# Patient Record
Sex: MALE | Race: WHITE | Employment: OTHER | URBAN - METROPOLITAN AREA
[De-identification: names, ages, dates, MRNs, and addresses within clinical notes are randomized per-mention and may not be internally consistent; named-entity substitution may affect disease eponyms.]

---

## 2018-02-16 ENCOUNTER — HOSPITAL ENCOUNTER (OUTPATIENT)
Dept: NUCLEAR MEDICINE | Age: 78
Discharge: HOME | End: 2018-02-16
Attending: UROLOGY
Payer: MEDICARE

## 2018-02-16 DIAGNOSIS — C61 PROSTATE CANCER (HCC): ICD-10-CM

## 2018-02-16 PROCEDURE — 78306 BONE IMAGING WHOLE BODY: CPT

## 2018-02-26 ENCOUNTER — HOSPITAL ENCOUNTER (OUTPATIENT)
Dept: RADIATION THERAPY | Age: 78
Discharge: HOME | End: 2018-02-26

## 2018-03-13 ENCOUNTER — ANESTHESIA (OUTPATIENT)
Dept: ENDOSCOPY | Age: 78
End: 2018-03-13
Payer: MEDICARE

## 2018-03-13 ENCOUNTER — ANESTHESIA EVENT (OUTPATIENT)
Dept: ENDOSCOPY | Age: 78
End: 2018-03-13
Payer: MEDICARE

## 2018-03-13 ENCOUNTER — HOSPITAL ENCOUNTER (OUTPATIENT)
Age: 78
Setting detail: OUTPATIENT SURGERY
Discharge: HOME | End: 2018-03-13
Attending: INTERNAL MEDICINE | Admitting: INTERNAL MEDICINE
Payer: MEDICARE

## 2018-03-13 VITALS
HEIGHT: 70 IN | SYSTOLIC BLOOD PRESSURE: 112 MMHG | DIASTOLIC BLOOD PRESSURE: 53 MMHG | TEMPERATURE: 98.4 F | HEART RATE: 58 BPM | RESPIRATION RATE: 19 BRPM | BODY MASS INDEX: 30.64 KG/M2 | WEIGHT: 214 LBS | OXYGEN SATURATION: 97 %

## 2018-03-13 LAB
GLUCOSE BLD STRIP.AUTO-MCNC: 130 MG/DL (ref 65–100)
SERVICE CMNT-IMP: ABNORMAL

## 2018-03-13 PROCEDURE — 74011250636 HC RX REV CODE- 250/636: Performed by: INTERNAL MEDICINE

## 2018-03-13 PROCEDURE — 77030013992 HC SNR POLYP ENDOSC BSC -B: Performed by: INTERNAL MEDICINE

## 2018-03-13 PROCEDURE — 82962 GLUCOSE BLOOD TEST: CPT

## 2018-03-13 PROCEDURE — 74011000250 HC RX REV CODE- 250

## 2018-03-13 PROCEDURE — 76040000019: Performed by: INTERNAL MEDICINE

## 2018-03-13 PROCEDURE — 76060000031 HC ANESTHESIA FIRST 0.5 HR: Performed by: INTERNAL MEDICINE

## 2018-03-13 PROCEDURE — 74011250636 HC RX REV CODE- 250/636

## 2018-03-13 PROCEDURE — 88305 TISSUE EXAM BY PATHOLOGIST: CPT | Performed by: INTERNAL MEDICINE

## 2018-03-13 RX ORDER — PROPOFOL 10 MG/ML
INJECTION, EMULSION INTRAVENOUS
Status: DISCONTINUED | OUTPATIENT
Start: 2018-03-13 | End: 2018-03-13 | Stop reason: HOSPADM

## 2018-03-13 RX ORDER — SODIUM CHLORIDE 9 MG/ML
INJECTION, SOLUTION INTRAVENOUS
Status: DISCONTINUED | OUTPATIENT
Start: 2018-03-13 | End: 2018-03-13 | Stop reason: HOSPADM

## 2018-03-13 RX ORDER — ATROPINE SULFATE 0.1 MG/ML
0.4 INJECTION INTRAVENOUS
Status: DISCONTINUED | OUTPATIENT
Start: 2018-03-13 | End: 2018-03-13 | Stop reason: HOSPADM

## 2018-03-13 RX ORDER — DEXTROMETHORPHAN/PSEUDOEPHED 2.5-7.5/.8
1.2 DROPS ORAL
Status: DISCONTINUED | OUTPATIENT
Start: 2018-03-13 | End: 2018-03-13 | Stop reason: HOSPADM

## 2018-03-13 RX ORDER — LIDOCAINE HYDROCHLORIDE 20 MG/ML
INJECTION, SOLUTION EPIDURAL; INFILTRATION; INTRACAUDAL; PERINEURAL AS NEEDED
Status: DISCONTINUED | OUTPATIENT
Start: 2018-03-13 | End: 2018-03-13 | Stop reason: HOSPADM

## 2018-03-13 RX ORDER — SODIUM CHLORIDE 9 MG/ML
50 INJECTION, SOLUTION INTRAVENOUS CONTINUOUS
Status: DISCONTINUED | OUTPATIENT
Start: 2018-03-13 | End: 2018-03-13 | Stop reason: HOSPADM

## 2018-03-13 RX ORDER — FLUMAZENIL 0.1 MG/ML
0.2 INJECTION INTRAVENOUS
Status: DISCONTINUED | OUTPATIENT
Start: 2018-03-13 | End: 2018-03-13 | Stop reason: HOSPADM

## 2018-03-13 RX ORDER — NALOXONE HYDROCHLORIDE 0.4 MG/ML
0.4 INJECTION, SOLUTION INTRAMUSCULAR; INTRAVENOUS; SUBCUTANEOUS
Status: DISCONTINUED | OUTPATIENT
Start: 2018-03-13 | End: 2018-03-13 | Stop reason: HOSPADM

## 2018-03-13 RX ORDER — EPINEPHRINE 0.1 MG/ML
1 INJECTION INTRACARDIAC; INTRAVENOUS
Status: DISCONTINUED | OUTPATIENT
Start: 2018-03-13 | End: 2018-03-13 | Stop reason: HOSPADM

## 2018-03-13 RX ORDER — GLYBURIDE 5 MG/1
5 TABLET ORAL
COMMUNITY
End: 2022-03-16

## 2018-03-13 RX ORDER — PROPOFOL 10 MG/ML
INJECTION, EMULSION INTRAVENOUS AS NEEDED
Status: DISCONTINUED | OUTPATIENT
Start: 2018-03-13 | End: 2018-03-13 | Stop reason: HOSPADM

## 2018-03-13 RX ORDER — MIDAZOLAM HYDROCHLORIDE 1 MG/ML
.25-5 INJECTION, SOLUTION INTRAMUSCULAR; INTRAVENOUS
Status: DISCONTINUED | OUTPATIENT
Start: 2018-03-13 | End: 2018-03-13 | Stop reason: HOSPADM

## 2018-03-13 RX ADMIN — PROPOFOL 100 MCG/KG/MIN: 10 INJECTION, EMULSION INTRAVENOUS at 13:43

## 2018-03-13 RX ADMIN — LIDOCAINE HYDROCHLORIDE 50 MG: 20 INJECTION, SOLUTION EPIDURAL; INFILTRATION; INTRACAUDAL; PERINEURAL at 13:43

## 2018-03-13 RX ADMIN — PROPOFOL 60 MG: 10 INJECTION, EMULSION INTRAVENOUS at 13:43

## 2018-03-13 RX ADMIN — SODIUM CHLORIDE: 9 INJECTION, SOLUTION INTRAVENOUS at 13:40

## 2018-03-13 RX ADMIN — SODIUM CHLORIDE: 9 INJECTION, SOLUTION INTRAVENOUS at 13:36

## 2018-03-13 RX ADMIN — SODIUM CHLORIDE 50 ML/HR: 900 INJECTION, SOLUTION INTRAVENOUS at 12:10

## 2018-03-13 NOTE — PROCEDURES
Georgia Son M.D.  [phone]            3/13/2018          Colonoscopy Operative Report  Sal Dominguez  :  1940  [facility] Medical Record Number:  [id number]      Indications:    Screening colonoscopy     :  Darvin Huynh MD    Referring Provider: Perla Chapa MD    Sedation:  MAC anesthesia    Pre-Procedural Exam:      Airway: clear,  No airway problems anticipated  Heart: RRR, without gallops or rubs  Lungs: clear bilaterally without wheezes, crackles, or rhonchi  Abdomen: soft, nontender, nondistended, bowel sounds present  Mental Status: awake, alert and oriented to person, place and time     Procedure Details:  After informed consent was obtained with all risks and benefits of procedure explained and preoperative exam completed, the patient was taken to the endoscopy suite and placed in the left lateral decubitus position. Upon sequential sedation as per above, a digital rectal exam was performed. The Olympus videocolonoscope  was inserted in the rectum and carefully advanced to the cecum, which was identified by the ileocecal valve and appendiceal orifice. The quality of preparation was good. The colonoscope was slowly withdrawn with careful inspection and evaluation between folds. Retroflexion in the rectum was performed. Findings:   Terminal Ileum: not intubated  Cecum: normal  Ascending Colon: 1  Sessile polyp(s), the largest 4 mm in size;  Transverse Colon: normal  Descending Colon: 1  Sessile polyp(s), the largest 3 mm in size;  Sigmoid: no mucosal lesion appreciated  moderate diverticulosis; Rectum: no mucosal lesion appreciated  Grade 1 internal hemorrhoid(s);     Interventions:  2 complete polypectomy were performed using cold snare  and the polyps were  retrieved    Specimen Removed:  specimen #1, 4 mm in size, located in the ascending colon removed by cold snare and retrieved for pathology  #2, 3 mm in size, located in the descending colon removed by cold snare and retrieved for pathology    Complications: None. EBL:  None. Impression:  Two polyps removed and sent to pathology, otherwise mucosa within normal.                         Sigmoid diverticulosis and internal hemorrhoids    Recommendations:  -If adenoma is present, repeat colonoscopy in 5 years.   -High fiber diet.    -Resume normal medication(s). Discharge Disposition:  Home in the company of a  when able to ambulate.     Ez Hansen MD  3/13/2018  2:01 PM

## 2018-03-13 NOTE — ANESTHESIA POSTPROCEDURE EVALUATION
Post-Anesthesia Evaluation and Assessment    Patient: Capri Ceballos MRN: [id number]  SSN: [id number]    YOB: 1940  Age: 78 y.o. Sex: male       Cardiovascular Function/Vital Signs  Visit Vitals    /82    Pulse 73    Temp 36.7 °C (98.1 °F)    Resp 20    Ht 5' 10\" (1.778 m)    Wt 97.1 kg (214 lb)    SpO2 99%    BMI 30.71 kg/m2       Patient is status post MAC anesthesia for Procedure(s):  COLONOSCOPY  ENDOSCOPIC POLYPECTOMY. Nausea/Vomiting: None    Postoperative hydration reviewed and adequate. Pain:  Pain Scale 1: Numeric (0 - 10) (03/13/18 1157)  Pain Intensity 1: 0 (03/13/18 1157)   Managed    Neurological Status: At baseline    Mental Status and Level of Consciousness: Arousable    Pulmonary Status:   O2 Device: Room air (03/13/18 1155)   Adequate oxygenation and airway patent    Complications related to anesthesia: None    Post-anesthesia assessment completed.  No concerns    Signed By: Francia Rhodes MD     March 13, 2018

## 2018-03-13 NOTE — ROUTINE PROCESS
[facility]  1940  [id number]    Situation:  Verbal report received from: Jessy Davis RN  Procedure: Procedure(s):  COLONOSCOPY  ENDOSCOPIC POLYPECTOMY    Background:    Preoperative diagnosis: SCREENING  Postoperative diagnosis: diverticulosis, ascending colon polyp, descending colon polyp    :  Dr. Gala Diop  Assistant(s): Endoscopy Technician-1: Shira Cui  Endoscopy RN-1: Batool Deras RN    Specimens:   ID Type Source Tests Collected by Time Destination   1 : ascending colon polyp Preservative Colon, Ascending  Amado Clinton MD 3/13/2018 1351 Pathology   2 : descending colon polyp Preservative Colon, Descending  Amado Clinton MD 3/13/2018 1354 Pathology     H. Pylori  no    Assessment:  Intra-procedure medications     Anesthesia gave intra-procedure sedation and medications, see anesthesia flow sheet yes    Intravenous fluids: NS@ KVO     Vital signs stable     Abdominal assessment: round and soft     Recommendation:  Discharge patient per MD order  Family or Friend   Permission to share finding with family or friend yes

## 2018-03-13 NOTE — H&P
Ingrid Snyder M.D.  [phone]            History and Physical       NAME:  Preethi Cooney   :   1940   MRN:   [id number]       Referring Physician:  Dr. Pratt [facility] Date: 3/13/2018 1:38 PM    Chief Complaint:  Colon cancer screening    History of Present Illness:  Patient is a 78 y.o. who is seen for colon cancer screening. Denies any ongoing GI complaints. 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 Taking?   atorvastatin (LIPITOR) 10 mg tablet 3/11/2018 at am  Yes No   Sig: Take  by mouth daily. glyBURIDE (DIABETA) 5 mg tablet 3/12/2018 at am  Yes Yes   Sig: Take 5 mg by mouth Daily (before breakfast). metformin (GLUCOPHAGE) 500 mg tablet 3/11/2018 at am  Yes No   Sig: Take 500 mg by mouth three (3) times daily (with meals). omega-3 fatty acids-vitamin e (FISH OIL) 1,000 mg Cap 3/11/2018 at am  Yes No   Sig: Take  by mouth two (2) times a day. valsartan-hydrochlorothiazide (DIOVAN HCT) 160-12.5 mg per tablet 3/13/2018 at am  Yes No   Sig: Take 1 Tab by mouth daily.       Facility-Administered Medications: None       Hospital Medications:  Current Facility-Administered Medications   Medication Dose Route Frequency    0.9% sodium chloride infusion  50 mL/hr IntraVENous CONTINUOUS    midazolam (VERSED) injection 0.25-5 mg  0.25-5 mg IntraVENous Multiple    naloxone (NARCAN) injection 0.4 mg  0.4 mg IntraVENous Multiple    flumazenil (ROMAZICON) 0.1 mg/mL injection 0.2 mg  0.2 mg IntraVENous Multiple    simethicone (MYLICON) 03BX/2.5UK oral drops 80 mg  1.2 mL Oral Multiple    atropine injection 0.4 mg  0.4 mg IntraVENous ONCE PRN    EPINEPHrine (ADRENALIN) 0.1 mg/mL syringe 1 mg  1 mg Endoscopically ONCE PRN       Social History:  Social History   Substance Use Topics    Smoking status: Never Smoker    Smokeless tobacco: Never Used    Alcohol use 1.2 oz/week     2 Shots of liquor per week       Family History:  History reviewed. No pertinent family history. Review of Systems:      Constitutional: negative fever, negative chills, negative weight loss  Eyes:   negative visual changes  ENT:   negative sore throat, tongue or lip swelling  Respiratory:  negative cough, negative dyspnea  Cards:  negative for chest pain, palpitations, lower extremity edema  GI:   See HPI  :  negative for frequency, dysuria  Integument:  negative for rash and pruritus  Heme:  negative for easy bruising and gum/nose bleeding  Musculoskel: negative for myalgias,  back pain and muscle weakness  Neuro: negative for headaches, dizziness, vertigo  Psych:  negative for feelings of anxiety, depression       Objective:   Patient Vitals for the past 8 hrs:   BP Temp Pulse Resp SpO2 Height Weight   03/13/18 1155 174/82 98.1 °F (36.7 °C) 73 20 99 % 5' 10\" (1.778 m) 97.1 kg (214 lb)             EXAM:     NEURO-a&o   HEENT-wnl   LUNGS-clear    COR-regular rate and rhythym     ABD-soft , no tenderness, no rebound, good bs     EXT-no edema     Data Review     No results for input(s): WBC, HGB, HCT, PLT, HGBEXT, HCTEXT, PLTEXT in the last 72 hours. No results for input(s): NA, K, CL, CO2, BUN, CREA, GLU, PHOS, CA in the last 72 hours.   No results for input(s): SGOT, GPT, AP, TBIL, TP, ALB, GLOB, GGT, AML, LPSE in the last 72 hours. No lab exists for component: AMYP, HLPSE  No results for input(s): INR, PTP, APTT in the last 72 hours. No lab exists for component: INREXT    Patient Active Problem List   Diagnosis Code    CAD (coronary artery disease), native coronary artery I25.10    Type II or unspecified type diabetes mellitus without mention of complication, not stated as uncontrolled E11.9    Essential hypertension, benign I10    Obesity E66.9    S/P CABG (status post coronary artery bypass graft)       Assessment:   · Colon cancer screening   Plan:   · Colonoscopy today.      Signed By: Francisco J Jimenez MD     3/13/2018  1:38 PM

## 2018-03-13 NOTE — ANESTHESIA PREPROCEDURE EVALUATION
Anesthetic History   No history of anesthetic complications            Review of Systems / Medical History  Patient summary reviewed, nursing notes reviewed and pertinent labs reviewed    Pulmonary  Within defined limits                 Neuro/Psych   Within defined limits           Cardiovascular    Hypertension          CAD, CABG (2010) and hyperlipidemia    Exercise tolerance: >4 METS  Comments: Not on beta blocker   GI/Hepatic/Renal  Within defined limits              Endo/Other    Diabetes    Cancer (prostate)     Other Findings   Comments: Screen    Prostate CA, getting work up before radiation         Physical Exam    Airway  Mallampati: IV  TM Distance: < 4 cm  Neck ROM: normal range of motion   Mouth opening: Diminished (comment)     Cardiovascular  Regular rate and rhythm,  S1 and S2 normal,  no murmur, click, rub, or gallop  Rhythm: regular  Rate: normal         Dental  No notable dental hx       Pulmonary  Breath sounds clear to auscultation               Abdominal  GI exam deferred       Other Findings            Anesthetic Plan    ASA: 3  Anesthesia type: MAC            Anesthetic plan and risks discussed with: Patient

## 2018-03-13 NOTE — DISCHARGE INSTRUCTIONS
[address] [facility]. Kiley Burgos M.D.  [phone]            COLON DISCHARGE INSTRUCTIONS       3/13/2018    Tanner Suarez  :  1940  [facility] Medical Record Number:  [id number]      COLONOSCOPY FINDINGS:  Your colonoscopy showed two small polyps and sigmoid diverticulosis, otherwise appeared within normal. Small internal hemorrhoids. DISCOMFORT:  Redness at IV site- apply warm compress to area; if redness or soreness persist- contact your physician  There may be a slight amount of blood passed from the rectum  Gaseous discomfort- walking, belching will help relieve any discomfort  You may not operate a vehicle for 12 hours  You may not engage in an occupation involving machinery or appliances for rest of today  You may not drink alcoholic beverages for at least 12 hours  Avoid making any critical decisions for at least 24 hour  DIET:   High fiber diet. - however -  remember your colon is empty and a heavy meal will produce gas. Avoid these foods:  vegetables, fried / greasy foods, carbonated drinks for today     ACTIVITY:  You may resume your normal daily activities it is recommended that you spend the remainder of the day resting -  avoid any strenuous activity. CALL M.D. ANY SIGN OF:   Increasing pain, nausea, vomiting  Abdominal distension (swelling)  New increased bleeding (oral or rectal)  Fever (chills)  Pain in chest area  Bloody discharge from nose or mouth   Shortness of breath    Follow-up Instructions:   Call Dr. Elizabet Sunshine if any questions or problems. Telephone # [phone]  Biopsy results will be available in  5 to 7 days  Should have a repeat colonoscopy in 5 years if polyps show adenoma.

## 2018-03-13 NOTE — PERIOP NOTES
Patient tolerated procedure without problems. Abdomen soft and patient arousable and voices no complaints Report received from CRNA, see anesthesia note. Patient transported to endoscopy recovery area and verbal bedside report given to [facility].

## 2018-03-13 NOTE — IP AVS SNAPSHOT
[address] 
[phone] Patient: Dada Reese MRN: [id number] YEX: About your hospitalization You were admitted on:  2018 You last received care in the:  [facility] ENDOSCOPY You were discharged on:  2018 Why you were hospitalized Your primary diagnosis was:  Not on File Follow-up Information None Discharge Orders None A check durga indicates which time of day the medication should be taken. My Medications CONTINUE taking these medications Instructions Each Dose to Equal  
 Morning Noon Evening Bedtime DIOVAN -12.5 mg per tablet Generic drug:  valsartan-hydroCHLOROthiazide Your last dose was: Your next dose is: Take 1 Tab by mouth daily. 1 Tab FISH OIL 1,000 mg Cap Generic drug:  omega-3 fatty acids-vitamin e Your last dose was: Your next dose is: Take  by mouth two (2) times a day. glyBURIDE 5 mg tablet Commonly known as:  Norm Mirtha Your last dose was: Your next dose is: Take 5 mg by mouth Daily (before breakfast). 5 mg  
    
   
   
   
  
 LIPITOR 10 mg tablet Generic drug:  atorvastatin Your last dose was: Your next dose is: Take  by mouth daily. metFORMIN 500 mg tablet Commonly known as:  GLUCOPHAGE Your last dose was: Your next dose is: Take 500 mg by mouth three (3) times daily (with meals). 500 mg Discharge Instructions [address] [facility] [facility] Pedrito Ocampo M.D. 
[phone] COLON DISCHARGE INSTRUCTIONS 
    
3/13/2018 Dada Reese :  1940 [place] Medical Record Number:  [id number] COLONOSCOPY FINDINGS: 
 Your colonoscopy showed two small polyps and sigmoid diverticulosis, otherwise appeared within normal. Small internal hemorrhoids. DISCOMFORT: 
Redness at IV site- apply warm compress to area; if redness or soreness persist- contact your physician There may be a slight amount of blood passed from the rectum Gaseous discomfort- walking, belching will help relieve any discomfort You may not operate a vehicle for 12 hours You may not engage in an occupation involving machinery or appliances for rest of today You may not drink alcoholic beverages for at least 12 hours Avoid making any critical decisions for at least 24 hour DIET: 
 High fiber diet.  however -  remember your colon is empty and a heavy meal will produce gas. Avoid these foods:  vegetables, fried / greasy foods, carbonated drinks for today ACTIVITY: 
You may resume your normal daily activities it is recommended that you spend the remainder of the day resting -  avoid any strenuous activity. CALL M.D. ANY SIGN OF: Increasing pain, nausea, vomiting Abdominal distension (swelling) New increased bleeding (oral or rectal) Fever (chills) Pain in chest area Bloody discharge from nose or mouth Shortness of breath Follow-up Instructions: 
 Call Dr. Love Malik if any questions or problems. Telephone # [phone] Biopsy results will be available in  5 to 7 days Should have a repeat colonoscopy in 5 years if polyps show adenoma. Introducing [facility]! [facility] introduces [employer] patient portal. Now you can access parts of your medical record, email your doctor's office, and request medication refills online. 1. In your internet browser, go to [email or web address] 2. Click on the First Time User? Click Here link in the Sign In box. You will see the New Member Sign Up page. 3. Enter your [employer] Access Code exactly as it appears below.  You will not need to use this code after youve completed the sign-up process. If you do not sign up before the expiration date, you must request a new code. · [employer] Access Code: [email or web address] Expires: 5/8/2018  4:22 PM 
 
4. Enter the last four digits of your Social Security Number (xxxx) and Date of Birth (mm/dd/yyyy) as indicated and click Submit. You will be taken to the next sign-up page. 5. Create a [employer] ID. This will be your [employer] login ID and cannot be changed, so think of one that is secure and easy to remember. 6. Create a [employer] password. You can change your password at any time. 7. Enter your Password Reset Question and Answer. This can be used at a later time if you forget your password. 8. Enter your e-mail address. You will receive e-mail notification when new information is available in [address] 9. Click Sign Up. You can now view and download portions of your medical record. 10. Click the Download Summary menu link to download a portable copy of your medical information. If you have questions, please visit the Frequently Asked Questions section of the [employer] website. Remember, [employer] is NOT to be used for urgent needs. For medical emergencies, dial 911. Now available from your iPhone and Android! Providers Seen During Your Hospitalization Provider Specialty Primary office phone Jesse Ramirez MD Gastroenterology [phone] Your Primary Care Physician (PCP) Primary Care Physician Office Phone Office Fax Joselito Simmons [phone] You are allergic to the following No active allergies Recent Documentation Height Weight BMI Smoking Status 1.778 m 97.1 kg 30.71 kg/m2 Never Smoker Emergency Contacts Name Discharge Info Relation Home Work [facility] DISCHARGE CAREGIVER [3] Spouse [3] [phone] Patient Belongings The following personal items are in your possession at time of discharge: 
  Dental Appliances: None  Visual Aid: Glasses, With patient Please provide this summary of care documentation to your next provider. Signatures-by signing, you are acknowledging that this After Visit Summary has been reviewed with you and you have received a copy. Patient Signature:  ____________________________________________________________ Date:  ____________________________________________________________  
  
Janace Pat Provider Signature:  ____________________________________________________________ Date:  ____________________________________________________________

## 2021-01-06 ENCOUNTER — HOSPITAL ENCOUNTER (OUTPATIENT)
Dept: RADIATION THERAPY | Age: 81
Discharge: HOME | End: 2021-01-06

## 2021-11-11 ENCOUNTER — HOSPITAL ENCOUNTER (EMERGENCY)
Age: 81
Discharge: HOME | End: 2021-11-11
Attending: EMERGENCY MEDICINE
Payer: MEDICARE

## 2021-11-11 ENCOUNTER — APPOINTMENT (OUTPATIENT)
Dept: GENERAL RADIOLOGY | Age: 81
End: 2021-11-11
Attending: STUDENT IN AN ORGANIZED HEALTH CARE EDUCATION/TRAINING PROGRAM
Payer: MEDICARE

## 2021-11-11 ENCOUNTER — APPOINTMENT (OUTPATIENT)
Dept: CT IMAGING | Age: 81
End: 2021-11-11
Attending: STUDENT IN AN ORGANIZED HEALTH CARE EDUCATION/TRAINING PROGRAM
Payer: MEDICARE

## 2021-11-11 VITALS
TEMPERATURE: 98.2 F | SYSTOLIC BLOOD PRESSURE: 117 MMHG | BODY MASS INDEX: 30.64 KG/M2 | OXYGEN SATURATION: 94 % | WEIGHT: 214 LBS | HEIGHT: 70 IN | RESPIRATION RATE: 18 BRPM | DIASTOLIC BLOOD PRESSURE: 74 MMHG | HEART RATE: 89 BPM
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
## 2021-11-11 LAB
ALBUMIN SERPL-MCNC: 3.8 G/DL (ref 3.5–5)
ALBUMIN/GLOB SERPL: 1.2 {RATIO} (ref 1.1–2.2)
ALP SERPL-CCNC: 53 U/L (ref 45–117)
ALT SERPL-CCNC: 31 U/L (ref 12–78)
ANION GAP SERPL CALC-SCNC: 8 MMOL/L (ref 5–15)
AST SERPL-CCNC: 25 U/L (ref 15–37)
BASOPHILS # BLD: 0 K/UL (ref 0–0.1)
BASOPHILS NFR BLD: 0 % (ref 0–1)
BILIRUB SERPL-MCNC: 0.7 MG/DL (ref 0.2–1)
BUN SERPL-MCNC: 29 MG/DL (ref 6–20)
BUN/CREAT SERPL: 25 (ref 12–20)
CALCIUM SERPL-MCNC: 10 MG/DL (ref 8.5–10.1)
CHLORIDE SERPL-SCNC: 100 MMOL/L (ref 97–108)
CK MB CFR SERPL CALC: 4.3 % (ref 0–2.5)
CK MB SERPL-MCNC: 17.8 NG/ML (ref 5–25)
CK SERPL-CCNC: 417 U/L (ref 39–308)
CO2 SERPL-SCNC: 29 MMOL/L (ref 21–32)
COMMENT, HOLDF: NORMAL
CREAT SERPL-MCNC: 1.16 MG/DL (ref 0.7–1.3)
DIFFERENTIAL METHOD BLD: ABNORMAL
EOSINOPHIL # BLD: 0 K/UL (ref 0–0.4)
EOSINOPHIL NFR BLD: 0 % (ref 0–7)
ERYTHROCYTE [DISTWIDTH] IN BLOOD BY AUTOMATED COUNT: 13.2 % (ref 11.5–14.5)
GLOBULIN SER CALC-MCNC: 3.2 G/DL (ref 2–4)
GLUCOSE SERPL-MCNC: 163 MG/DL (ref 65–100)
HCT VFR BLD AUTO: 41.4 % (ref 36.6–50.3)
HGB BLD-MCNC: 13.5 G/DL (ref 12.1–17)
IMM GRANULOCYTES # BLD AUTO: 0.1 K/UL (ref 0–0.04)
IMM GRANULOCYTES NFR BLD AUTO: 1 % (ref 0–0.5)
LYMPHOCYTES # BLD: 0.7 K/UL (ref 0.8–3.5)
LYMPHOCYTES NFR BLD: 5 % (ref 12–49)
MCH RBC QN AUTO: 28.3 PG (ref 26–34)
MCHC RBC AUTO-ENTMCNC: 32.6 G/DL (ref 30–36.5)
MCV RBC AUTO: 86.8 FL (ref 80–99)
MONOCYTES # BLD: 0.7 K/UL (ref 0–1)
MONOCYTES NFR BLD: 5 % (ref 5–13)
NEUTS SEG # BLD: 12.3 K/UL (ref 1.8–8)
NEUTS SEG NFR BLD: 89 % (ref 32–75)
NRBC # BLD: 0 K/UL (ref 0–0.01)
NRBC BLD-RTO: 0 PER 100 WBC
PLATELET # BLD AUTO: 208 K/UL (ref 150–400)
PMV BLD AUTO: 11.1 FL (ref 8.9–12.9)
POTASSIUM SERPL-SCNC: 3.6 MMOL/L (ref 3.5–5.1)
PROT SERPL-MCNC: 7 G/DL (ref 6.4–8.2)
RBC # BLD AUTO: 4.77 M/UL (ref 4.1–5.7)
RBC MORPH BLD: ABNORMAL
SAMPLES BEING HELD,HOLD: NORMAL
SODIUM SERPL-SCNC: 137 MMOL/L (ref 136–145)
WBC # BLD AUTO: 13.8 K/UL (ref 4.1–11.1)

## 2021-11-11 PROCEDURE — 96360 HYDRATION IV INFUSION INIT: CPT

## 2021-11-11 PROCEDURE — 80053 COMPREHEN METABOLIC PANEL: CPT

## 2021-11-11 PROCEDURE — 36415 COLL VENOUS BLD VENIPUNCTURE: CPT

## 2021-11-11 PROCEDURE — 72100 X-RAY EXAM L-S SPINE 2/3 VWS: CPT

## 2021-11-11 PROCEDURE — 74011250636 HC RX REV CODE- 250/636: Performed by: STUDENT IN AN ORGANIZED HEALTH CARE EDUCATION/TRAINING PROGRAM

## 2021-11-11 PROCEDURE — 82550 ASSAY OF CK (CPK): CPT

## 2021-11-11 PROCEDURE — 82553 CREATINE MB FRACTION: CPT

## 2021-11-11 PROCEDURE — 99284 EMERGENCY DEPT VISIT MOD MDM: CPT

## 2021-11-11 PROCEDURE — 70450 CT HEAD/BRAIN W/O DYE: CPT

## 2021-11-11 PROCEDURE — 85025 COMPLETE CBC W/AUTO DIFF WBC: CPT

## 2021-11-11 RX ADMIN — SODIUM CHLORIDE 1000 ML: 9 INJECTION, SOLUTION INTRAVENOUS at 12:27

## 2021-11-11 NOTE — ED TRIAGE NOTES
Pt arrives via EMS after going missing for approx 20 hours. Pt was found in woods by FRANCES. No complaints at this time. Hx mild dementia. Pt remembers that he went into woods looking for his cat.

## 2021-11-11 NOTE — ED PROVIDER NOTES
80 y.o. M with CAD s/p CABG, HTN, DM2, and prostate cancer presenting via EMS after being found in the woods. Per his daughter the last time he was seen was 4pm yesterday and was found in the [place] 500 yards away at 11am today. The patient reports he remembers he went outside yesterday to feed his cats and ended up following one into the woods. It became dark and he had difficulty finding his way back. He ultimately fell, landed on his back, and remained on the ground until someone could find him. He reports back pain and a headache but denies any pain in any other area. Per his daughter he has been experiencing waxing and waning memory over the last few years but has no official diagnosis of dementia. He lives at home with his wife who helps him around the home. She has no concerns about his safety and plans to get a tracking device moving forward. Past Medical History:   Diagnosis Date    CAD (coronary artery disease), native coronary artery 11/18/2010    Cancer ([facility] [address] 03/2018    prostate cancer treatment to be radioactive seed implants     Essential hypertension, benign 11/18/2010    Obesity 11/18/2010    S/P CABG (status post coronary artery bypass graft) 11/18/2010    Type II or unspecified type diabetes mellitus without mention of complication, not stated as uncontrolled 11/18/2010       Past Surgical History:   Procedure Laterality Date    CABG, ARTERY-VEIN, THREE  2003    COLONOSCOPY N/A 3/13/2018    COLONOSCOPY [facility] by Johanny Kellogg MD at [facility] ENDOSCOPY    HX TONSILLECTOMY           No family history on file.     Social History     Socioeconomic History    Marital status:      Spouse name: Not on file    Number of children: Not on file    Years of education: Not on file    Highest education level: Not on file   Occupational History    Not on file   Tobacco Use    Smoking status: Never Smoker    Smokeless tobacco: Never Used   Substance and Sexual Activity    Alcohol use: Yes     Alcohol/week: 2.0 standard drinks     Types: 2 Shots of liquor per week    Drug use: Not on file    Sexual activity: Not on file   Other Topics Concern    Not on file   Social History Narrative    Not on file     Social Determinants of Health     Financial Resource Strain:     Difficulty of Paying Living Expenses: Not on file   Food Insecurity:     Worried About Running Out of Food in the Last Year: Not on file    Jessy of Food in the Last Year: Not on file   Transportation Needs:     Lack of Transportation (Medical): Not on file    Lack of Transportation (Non-Medical): Not on file   Physical Activity:     Days of Exercise per Week: Not on file    Minutes of Exercise per Session: Not on file   Stress:     Feeling of Stress : Not on file   Social Connections:     Frequency of Communication with Friends and Family: Not on file    Frequency of Social Gatherings with Friends and Family: Not on file    Attends Mandaen Services: Not on file    Active Member of [address] [place] [address] [employer] or Organizations: Not on file    Attends Club or Organization Meetings: Not on file    Marital Status: Not on file   Intimate Partner Violence:     Fear of Current or Ex-Partner: Not on file    Emotionally Abused: Not on file    Physically Abused: Not on file    Sexually Abused: Not on file   Housing Stability:     Unable to Pay for Housing in the Last Year: Not on file    Number of [place] in the Last Year: Not on file    Unstable Housing in the Last Year: Not on file         ALLERGIES: Patient has no known allergies. Review of Systems   Constitutional: Negative for fatigue and fever. Respiratory: Negative for shortness of breath. Cardiovascular: Negative for chest pain. Gastrointestinal: Negative for abdominal distention, abdominal pain, diarrhea and nausea. Genitourinary: Negative for dysuria, flank pain, hematuria and urgency. Musculoskeletal: Positive for back pain.  Negative for neck pain and neck stiffness. Neurological: Positive for headaches. Negative for dizziness, seizures, light-headedness and numbness. Psychiatric/Behavioral: Positive for confusion. Vitals:    11/11/21 1213   BP: 131/66   Pulse: 92   Resp: 20   Temp: 97.5 °F (36.4 °C)   SpO2: 94%   Weight: 97.1 kg (214 lb)   Height: 5' 10\" (1.778 m)            Physical Exam  Constitutional:       General: He is not in acute distress. Appearance: He is not toxic-appearing. HENT:      Head: Normocephalic and atraumatic. Cardiovascular:      Rate and Rhythm: Normal rate and regular rhythm. Pulmonary:      Effort: Pulmonary effort is normal. No respiratory distress. Abdominal:      General: Bowel sounds are normal.      Tenderness: There is no abdominal tenderness. There is no guarding or rebound. Musculoskeletal:      Cervical back: No tenderness or bony tenderness. Normal range of motion. Thoracic back: No tenderness or bony tenderness. Lumbar back: Tenderness present. No bony tenderness. Right hip: No bony tenderness. Normal range of motion. Normal strength. Left hip: No bony tenderness. Normal range of motion. Normal strength. Right lower leg: No edema. Left lower leg: No edema. Skin:     General: Skin is dry. Coloration: Skin is pale. Comments: Cool to touch   Neurological:      General: No focal deficit present. Mental Status: He is oriented to person, place, and time. Sensory: No sensory deficit. Motor: No weakness. Psychiatric:         Mood and Affect: Mood normal.         Behavior: Behavior normal.          MDM  Number of Diagnoses or Management Options  Fall from ground level  Diagnosis management comments: 80 y.o. M with a PMH of CAD s/p CABG, HTN, and DM2 presenting after being found in the woods 20 hours later. CT head and XR no acute findings. CMP and CBC are non contributory. CK mildly elevated. Will give IV hydration and d/c home with PCP follow up. Procedures

## 2022-01-17 ENCOUNTER — OFFICE VISIT (OUTPATIENT)
Dept: NEUROLOGY | Age: 82
End: 2022-01-17
Payer: MEDICARE

## 2022-01-17 VITALS
SYSTOLIC BLOOD PRESSURE: 131 MMHG | RESPIRATION RATE: 14 BRPM | OXYGEN SATURATION: 98 % | WEIGHT: 173 LBS | HEART RATE: 69 BPM | DIASTOLIC BLOOD PRESSURE: 73 MMHG | BODY MASS INDEX: 24.77 KG/M2 | HEIGHT: 70 IN

## 2022-01-17 DIAGNOSIS — G30.1 LATE ONSET ALZHEIMER'S DEMENTIA WITHOUT BEHAVIORAL DISTURBANCE (HCC): Primary | ICD-10-CM

## 2022-01-17 DIAGNOSIS — F02.80 LATE ONSET ALZHEIMER'S DEMENTIA WITHOUT BEHAVIORAL DISTURBANCE (HCC): Primary | ICD-10-CM

## 2022-01-17 PROCEDURE — G8754 DIAS BP LESS 90: HCPCS | Performed by: PSYCHIATRY & NEUROLOGY

## 2022-01-17 PROCEDURE — G8536 NO DOC ELDER MAL SCRN: HCPCS | Performed by: PSYCHIATRY & NEUROLOGY

## 2022-01-17 PROCEDURE — G8432 DEP SCR NOT DOC, RNG: HCPCS | Performed by: PSYCHIATRY & NEUROLOGY

## 2022-01-17 PROCEDURE — G8752 SYS BP LESS 140: HCPCS | Performed by: PSYCHIATRY & NEUROLOGY

## 2022-01-17 PROCEDURE — 1101F PT FALLS ASSESS-DOCD LE1/YR: CPT | Performed by: PSYCHIATRY & NEUROLOGY

## 2022-01-17 PROCEDURE — G8427 DOCREV CUR MEDS BY ELIG CLIN: HCPCS | Performed by: PSYCHIATRY & NEUROLOGY

## 2022-01-17 PROCEDURE — 99204 OFFICE O/P NEW MOD 45 MIN: CPT | Performed by: PSYCHIATRY & NEUROLOGY

## 2022-01-17 PROCEDURE — G8420 CALC BMI NORM PARAMETERS: HCPCS | Performed by: PSYCHIATRY & NEUROLOGY

## 2022-01-17 RX ORDER — MEMANTINE HYDROCHLORIDE 5 MG/1
TABLET ORAL
Qty: 42 TABLET | Refills: 0 | Status: SHIPPED | OUTPATIENT
Start: 2022-01-17 | End: 2022-02-04

## 2022-01-17 RX ORDER — MEMANTINE HYDROCHLORIDE 10 MG/1
10 TABLET ORAL 2 TIMES DAILY
Qty: 60 TABLET | Refills: 3 | Status: SHIPPED | OUTPATIENT
Start: 2022-01-17 | End: 2022-02-10 | Stop reason: SDUPTHER

## 2022-01-17 NOTE — PROGRESS NOTES
Chief Complaint   Patient presents with    New Patient     Referred by PCP // short term memory concerns x 6 months, worsening // presents with wife today

## 2022-01-17 NOTE — PROGRESS NOTES
[facility] Neurology Clinics and [address] at [facility] Neurology Clinics at [address] [facility], [address] [place] [id number] [address]  [phone] Office  [phone]           Referring: No referring provider defined for this encounter. Chief Complaint   Patient presents with    New Patient     Referred by PCP // short term memory concerns x 6 months, worsening // presents with wife today      30-year-old gentleman who presents today companied by his wife for initial neurologic consultation regarding progressive memory difficulty. Both provide history. He says he has difficulty getting out of his thoughts. Has difficulty carrying on conversations. Forgetful. Repeats things. His wife also endorses the same. He notes symptoms have been progressive at least over the last 8 or 9 months but what really aneudy their concern was when he wandered off into the woods and got lost and could not find his way back. That was in November. He has difficulty carrying out every day test.  He forgets where the dog food is. He forgets to feed the dogs. His father had dementia starting in early 76s. He has had a recent blood work with Dr. Darylene Been and they are unsure as to whether or not B12 and thyroid were checked. He had a head CT in the emergency department. Record review finds emergency department visit November 11, 2021 where patient was brought in by EMS and found in the woods. He was last seen 4 PM the day prior and was found about 500 yards away the next morning at 11 AM in the woods. He remembers going outside to feed the cats and then he ended up following 1 in the woods and could not find his way back. Plans for a tracking device for said to be in the works.     CT scan of the head personally reviewed and unremarkable  CK-MB 17.8  CK4 17  CBC with white count 13.8 otherwise unremarkable  Metabolic panel with elevated glucose and BUN otherwise unremarkable. Past Medical History:   Diagnosis Date    CAD (coronary artery disease), native coronary artery 11/18/2010    Cancer ([facility] [address] 03/2018    prostate cancer treatment to be radioactive seed implants     Essential hypertension, benign 11/18/2010    Obesity 11/18/2010    S/P CABG (status post coronary artery bypass graft) 11/18/2010    Type II or unspecified type diabetes mellitus without mention of complication, not stated as uncontrolled 11/18/2010       Past Surgical History:   Procedure Laterality Date    COLONOSCOPY N/A 3/13/2018    COLONOSCOPY performed by Adithya Paul MD at [facility] HX TONSILLECTOMY      [place] CABG, ARTERY-VEIN, THREE  2003       Current Outpatient Medications   Medication Sig Dispense Refill    valsartan-hydrochlorothiazide (DIOVAN HCT) 160-12.5 mg per tablet Take 1 Tab by mouth daily.  omega-3 fatty acids-vitamin e (FISH OIL) 1,000 mg Cap Take  by mouth two (2) times a day.  atorvastatin (LIPITOR) 10 mg tablet Take  by mouth daily.  metformin (GLUCOPHAGE) 500 mg tablet Take 500 mg by mouth three (3) times daily (with meals).  glyBURIDE (DIABETA) 5 mg tablet Take 5 mg by mouth Daily (before breakfast). (Patient not taking: Reported on 1/17/2022)          No Known Allergies    Social History     Tobacco Use    Smoking status: Never Smoker    Smokeless tobacco: Never Used   Substance Use Topics    Alcohol use: Not Currently     Comment: occassional    Drug use: Never       No family history on file. Review of Systems  Pertinent positives and negatives as noted. Examination  Visit Vitals  /73 (BP 1 Location: Left upper arm, BP Patient Position: Sitting)   Pulse 69   Resp 14   Ht 5' 10\" (1.778 m)   Wt 78.5 kg (173 lb)   SpO2 98%   BMI 24.82 kg/m²     Pleasant well-appearing gentleman. He is awake alert and interactive. He is oriented to himself and his wife.   He cannot tell me the month. When I give him a list he says is February. He knows it is 2022. He recalls Ruth Ortiz is the current President Darryn Loaiza is the previous president. Registration 3/3 recall 0/3 when given clues 1/3. Correctly calculates a number of quarters in $1.75. Knows are on the second floor and says were in [place]. Spells the word house forward has some difficulty going backward. Intact cranial nerves II-XII. No nystagmus. No pronation or drift. Age-related paratonia. No abnormal movement. Strength is full in the upper and lower extremities in all muscle groups today testing. Symmetrical reflexes. No ataxia    Impression/Plan  62-year-old gentleman with progressive cognitive difficulty and we discussed he likely has dementia likely Alzheimer's type. Will send for his laboratory analyses and a B12 and thyroid have not been checked we will check there is  EEG for baseline rhythms  Carotid Doppler  [facility] and we discussed rationale for therapy which is stabilization and slowing of decline  Return in 2 months and start Aricept    Parul Estrada MD          This note was created using voice recognition software. Despite editing, there may be syntax errors.

## 2022-01-27 ENCOUNTER — OFFICE VISIT (OUTPATIENT)
Dept: NEUROLOGY | Age: 82
End: 2022-01-27

## 2022-01-27 DIAGNOSIS — G30.1 LATE ONSET ALZHEIMER'S DEMENTIA WITHOUT BEHAVIORAL DISTURBANCE (HCC): Primary | ICD-10-CM

## 2022-01-27 DIAGNOSIS — F02.80 LATE ONSET ALZHEIMER'S DEMENTIA WITHOUT BEHAVIORAL DISTURBANCE (HCC): Primary | ICD-10-CM

## 2022-02-04 RX ORDER — MEMANTINE HYDROCHLORIDE 5 MG/1
TABLET ORAL
Qty: 42 TABLET | Refills: 0 | Status: SHIPPED | OUTPATIENT
Start: 2022-02-04 | End: 2022-03-16 | Stop reason: DRUGHIGH

## 2022-02-07 NOTE — PROCEDURES
EEG:      Date:  01/27/22    Requesting Physician:  Emma Leal MD     An EEG is requested in this 82-year-old gentleman to evaluate for epileptiform abnormality. Medications:  Medications are said to include Namenda. This tracing is obtained during the awake and drowsy states. During wakefulness there are intermittent runs of posteriorly dominant and symmetric low to medium amplitude 9 cycle per second activities, which attenuate with eye opening. Lower voltage, faster frequency activities are seen symmetrically over the anterior head regions. Hyperventilation is not performed. Intermittent photic stimulation induces symmetric posterior driving responses. During drowsiness, the background rhythms attenuate and are replaced with diffuse, symmetric, theta range activities. Later stages of sleep are not attained. Interpretation:   This EEG recorded during the awake and drowsy states is normal.

## 2022-02-10 DIAGNOSIS — F02.80 LATE ONSET ALZHEIMER'S DEMENTIA WITHOUT BEHAVIORAL DISTURBANCE (HCC): Primary | ICD-10-CM

## 2022-02-10 DIAGNOSIS — G30.1 LATE ONSET ALZHEIMER'S DEMENTIA WITHOUT BEHAVIORAL DISTURBANCE (HCC): Primary | ICD-10-CM

## 2022-02-10 RX ORDER — MEMANTINE HYDROCHLORIDE 10 MG/1
10 TABLET ORAL 2 TIMES DAILY
Qty: 180 TABLET | Refills: 0 | Status: SHIPPED | OUTPATIENT
Start: 2022-02-10 | End: 2022-05-11

## 2022-03-16 ENCOUNTER — OFFICE VISIT (OUTPATIENT)
Dept: NEUROLOGY | Age: 82
End: 2022-03-16
Payer: MEDICARE

## 2022-03-16 VITALS
SYSTOLIC BLOOD PRESSURE: 126 MMHG | DIASTOLIC BLOOD PRESSURE: 66 MMHG | RESPIRATION RATE: 16 BRPM | HEART RATE: 69 BPM | OXYGEN SATURATION: 96 % | TEMPERATURE: 97.7 F | BODY MASS INDEX: 24.82 KG/M2 | WEIGHT: 173 LBS

## 2022-03-16 DIAGNOSIS — F02.80 LATE ONSET ALZHEIMER'S DEMENTIA WITHOUT BEHAVIORAL DISTURBANCE (HCC): Primary | ICD-10-CM

## 2022-03-16 DIAGNOSIS — G30.1 LATE ONSET ALZHEIMER'S DEMENTIA WITHOUT BEHAVIORAL DISTURBANCE (HCC): Primary | ICD-10-CM

## 2022-03-16 PROCEDURE — G8754 DIAS BP LESS 90: HCPCS | Performed by: PSYCHIATRY & NEUROLOGY

## 2022-03-16 PROCEDURE — G8420 CALC BMI NORM PARAMETERS: HCPCS | Performed by: PSYCHIATRY & NEUROLOGY

## 2022-03-16 PROCEDURE — 1101F PT FALLS ASSESS-DOCD LE1/YR: CPT | Performed by: PSYCHIATRY & NEUROLOGY

## 2022-03-16 PROCEDURE — G8752 SYS BP LESS 140: HCPCS | Performed by: PSYCHIATRY & NEUROLOGY

## 2022-03-16 PROCEDURE — G8427 DOCREV CUR MEDS BY ELIG CLIN: HCPCS | Performed by: PSYCHIATRY & NEUROLOGY

## 2022-03-16 PROCEDURE — G8510 SCR DEP NEG, NO PLAN REQD: HCPCS | Performed by: PSYCHIATRY & NEUROLOGY

## 2022-03-16 PROCEDURE — G8536 NO DOC ELDER MAL SCRN: HCPCS | Performed by: PSYCHIATRY & NEUROLOGY

## 2022-03-16 PROCEDURE — 99214 OFFICE O/P EST MOD 30 MIN: CPT | Performed by: PSYCHIATRY & NEUROLOGY

## 2022-03-16 RX ORDER — DONEPEZIL HYDROCHLORIDE 5 MG/1
5 TABLET, FILM COATED ORAL DAILY
Qty: 30 TABLET | Refills: 0 | Status: SHIPPED | OUTPATIENT
Start: 2022-03-16 | End: 2022-04-15

## 2022-03-16 RX ORDER — DONEPEZIL HYDROCHLORIDE 10 MG/1
10 TABLET, FILM COATED ORAL DAILY
Qty: 90 TABLET | Refills: 3 | Status: SHIPPED | OUTPATIENT
Start: 2022-03-16

## 2022-03-16 NOTE — PROGRESS NOTES
[facility] Neurology Clinics and [address] at [facility] Neurology Clinics at [address] [facility], [address] [facility] [address] [facility], [address] [place] [address]   [phone]              Chief Complaint   Patient presents with    Results    Alzheimers     doing well on memantine     Current Outpatient Medications   Medication Sig Dispense Refill    memantine (Namenda) 10 mg tablet Take 1 Tablet by mouth two (2) times a day. 180 Tablet 0    valsartan-hydrochlorothiazide (DIOVAN HCT) 160-12.5 mg per tablet Take 1 Tab by mouth daily.  omega-3 fatty acids-vitamin e (FISH OIL) 1,000 mg Cap Take  by mouth two (2) times a day.  atorvastatin (Lipitor) 20 mg tablet Take 20 mg by mouth daily.  metFORMIN (GLUCOPHAGE) 1,000 mg tablet Take 1,000 mg by mouth two (2) times a day. No Known Allergies  Social History     Tobacco Use    Smoking status: Never Smoker    Smokeless tobacco: Never Used   Substance Use Topics    Alcohol use: Not Currently     Comment: occassional    Drug use: Never     72-year-old gentleman who returns today for follow-up. His last visit with me was about 2 months ago. He was with progressive cognitive difficulty and I felt this was an Alzheimer's type dementia. We did send him for some testing to exclude other etiologies  Carotid Doppler with insignificant stenosis  Head CT from November unremarkable  EEG unremarkable  We started him on Namenda    Today he comes with his wife he tolerated medicines without difficulty. No new complaint. Examination  Visit Vitals  /66 (BP 1 Location: Left upper arm, BP Patient Position: Sitting, BP Cuff Size: Adult)   Pulse 69   Temp 97.7 °F (36.5 °C)   Resp 16   Wt 78.5 kg (173 lb)   SpO2 96%   BMI 24.82 kg/m²   Pleasant gentleman. Awake alert and interactive.   No ataxia      Impression/Plan  Alzheimer's type dementia progressive  Continue Namenda  Discussed adding Aricept to help give more stabilization and slowing of decline  Discussed staying active in all spheres  Follow in about 2 to 3 months    Tami Ackerman MD        This note was created using voice recognition software. Despite editing, there may be syntax errors.

## 2022-05-09 DIAGNOSIS — F02.80 LATE ONSET ALZHEIMER'S DEMENTIA WITHOUT BEHAVIORAL DISTURBANCE (HCC): ICD-10-CM

## 2022-05-09 DIAGNOSIS — G30.1 LATE ONSET ALZHEIMER'S DEMENTIA WITHOUT BEHAVIORAL DISTURBANCE (HCC): ICD-10-CM

## 2022-05-11 RX ORDER — MEMANTINE HYDROCHLORIDE 10 MG/1
TABLET ORAL
Qty: 180 TABLET | Refills: 0 | Status: SHIPPED | OUTPATIENT
Start: 2022-05-11 | End: 2022-08-18

## 2022-05-23 ENCOUNTER — OFFICE VISIT (OUTPATIENT)
Dept: NEUROLOGY | Age: 82
End: 2022-05-23
Payer: MEDICARE

## 2022-05-23 VITALS
HEART RATE: 73 BPM | DIASTOLIC BLOOD PRESSURE: 60 MMHG | OXYGEN SATURATION: 98 % | RESPIRATION RATE: 15 BRPM | BODY MASS INDEX: 23.24 KG/M2 | WEIGHT: 162 LBS | SYSTOLIC BLOOD PRESSURE: 98 MMHG

## 2022-05-23 DIAGNOSIS — G30.1 LATE ONSET ALZHEIMER'S DEMENTIA WITHOUT BEHAVIORAL DISTURBANCE (HCC): Primary | ICD-10-CM

## 2022-05-23 DIAGNOSIS — F90.0 ATTENTION DEFICIT HYPERACTIVITY DISORDER (ADHD), PREDOMINANTLY INATTENTIVE TYPE: ICD-10-CM

## 2022-05-23 DIAGNOSIS — F02.80 LATE ONSET ALZHEIMER'S DEMENTIA WITHOUT BEHAVIORAL DISTURBANCE (HCC): Primary | ICD-10-CM

## 2022-05-23 PROCEDURE — G8754 DIAS BP LESS 90: HCPCS | Performed by: NURSE PRACTITIONER

## 2022-05-23 PROCEDURE — G8420 CALC BMI NORM PARAMETERS: HCPCS | Performed by: NURSE PRACTITIONER

## 2022-05-23 PROCEDURE — G8432 DEP SCR NOT DOC, RNG: HCPCS | Performed by: NURSE PRACTITIONER

## 2022-05-23 PROCEDURE — G8427 DOCREV CUR MEDS BY ELIG CLIN: HCPCS | Performed by: NURSE PRACTITIONER

## 2022-05-23 PROCEDURE — 99214 OFFICE O/P EST MOD 30 MIN: CPT | Performed by: NURSE PRACTITIONER

## 2022-05-23 PROCEDURE — G8752 SYS BP LESS 140: HCPCS | Performed by: NURSE PRACTITIONER

## 2022-05-23 PROCEDURE — 1101F PT FALLS ASSESS-DOCD LE1/YR: CPT | Performed by: NURSE PRACTITIONER

## 2022-05-23 PROCEDURE — G8536 NO DOC ELDER MAL SCRN: HCPCS | Performed by: NURSE PRACTITIONER

## 2022-05-23 RX ORDER — ATOMOXETINE 25 MG/1
25 CAPSULE ORAL DAILY
Qty: 30 CAPSULE | Refills: 5 | Status: SHIPPED | OUTPATIENT
Start: 2022-05-23 | End: 2022-07-14 | Stop reason: SDUPTHER

## 2022-05-23 NOTE — PROGRESS NOTES
Hill Melchor is a [de-identified] y.o. male who presents with the following  Chief Complaint   Patient presents with    Follow-up       HPI       FU with wife for dementia. Added Aricept 10 mg to last visit  Also on Namenda 10 mg BID   No changes in memory   He has not seen any positives  He does feel he has not seen any negative either  Wife agrees  He is still having day to day memory concerns though   He will repeat, ask questions  Forget how to do things. He will need help with remembering pills, feeding the cats  Wife cooks and drives. He is sleeping well   No agitation or aggression   Some depression   He will watch tv but will end up staring out the window per wife report. He will become delusional at times thinking he has done or seen something. This can happen day to day   He likes to watch the news  Does like to get outside and walk some but not much   He is eating ok. No Known Allergies    Current Outpatient Medications   Medication Sig    atomoxetine (Strattera) 25 mg capsule Take 1 Capsule by mouth daily.  memantine (NAMENDA) 10 mg tablet TAKE 1 TABLET BY MOUTH TWO TIMES A DAY.  donepeziL (Aricept) 10 mg tablet Take 1 Tablet by mouth daily.  valsartan-hydrochlorothiazide (DIOVAN HCT) 160-12.5 mg per tablet Take 1 Tab by mouth daily.  omega-3 fatty acids-vitamin e (FISH OIL) 1,000 mg Cap Take  by mouth two (2) times a day.  atorvastatin (Lipitor) 20 mg tablet Take 20 mg by mouth daily.  metFORMIN (GLUCOPHAGE) 1,000 mg tablet Take 1,000 mg by mouth two (2) times a day. No current facility-administered medications for this visit.        Social History     Tobacco Use   Smoking Status Never Smoker   Smokeless Tobacco Never Used       Past Medical History:   Diagnosis Date    CAD (coronary artery disease), native coronary artery 11/18/2010    Cancer ([facility] [address] 03/2018    prostate cancer treatment to be radioactive seed implants     Essential hypertension, benign 11/18/2010    Obesity 11/18/2010    S/P CABG (status post coronary artery bypass graft) 11/18/2010    Type II or unspecified type diabetes mellitus without mention of complication, not stated as uncontrolled 11/18/2010       Past Surgical History:   Procedure Laterality Date    COLONOSCOPY N/A 3/13/2018    COL[facility] by [facility]a Ley MD at [facility] HX TONSILLECTOMY      [place] CABG, ARTERY-VEIN, THREE  2003       No family history on file. Social History     Socioeconomic History    Marital status:    Tobacco Use    Smoking status: Never Smoker    Smokeless tobacco: Never Used   Substance and Sexual Activity    Alcohol use: Not Currently     Comment: occassional    Drug use: Never       Review of Systems   Constitutional: Positive for malaise/fatigue. Eyes: Negative for blurred vision, double vision and photophobia. Respiratory: Negative for shortness of breath and wheezing. Cardiovascular: Negative for chest pain. Neurological: Positive for weakness. Psychiatric/Behavioral: Positive for depression and memory loss. Negative for hallucinations, substance abuse and suicidal ideas. The patient is not nervous/anxious and does not have insomnia. Remainder of comprehensive review is negative. Physical Exam :    Visit Vitals  BP 98/60 (BP 1 Location: Left arm, BP Patient Position: Sitting, BP Cuff Size: Adult)   Pulse 73   Resp 15   Wt 73.5 kg (162 lb)   SpO2 98%   BMI 23.24 kg/m²       General: Well defined, nourished, and groomed individual in no acute distress.    Neck: Supple, nontender, no bruits, no pain with resistance to active range of motion.    Musculoskeletal: Extremities revealed no edema and had full range of motion of joints.    Psych: Good mood and bright affect    NEUROLOGICAL EXAMINATION:    Mental Status: Alert and oriented to person, place, and not time. mmse 24    Cranial Nerves:    II, III, IV, VI: Visual acuity grossly intact.  Visual fields are normal.    Pupils are equal, round, and reactive to light and accommodation.    Extra-ocular movements are full and fluid. Fundoscopic exam was benign, no ptosis or nystagmus.    V-XII: Hearing is grossly intact. Facial features are symmetric, with normal sensation and strength. The palate rises symmetrically and the tongue protrudes midline. Sternocleidomastoids 5/5. Motor Examination: Normal tone, bulk, and strength, 5/5 muscle strength throughout. Coordination: Finger to nose was normal. No resting or intention tremor    Gait and Station: Steady while walking. Normal arm swing. No pronator drift. No muscle wasting or fasiculations noted. Reflexes: DTRs 2+ throughout. Results for orders placed or performed during the hospital encounter of 11/11/21   SAMPLES BEING HELD   Result Value Ref Range    SAMPLES BEING HELD 1SST,1DRK GRN,1RED     COMMENT        Add-on orders for these samples will be processed based on acceptable specimen integrity and analyte stability, which may vary by analyte. METABOLIC PANEL, COMPREHENSIVE   Result Value Ref Range    Sodium 137 136 - 145 mmol/L    Potassium 3.6 3.5 - 5.1 mmol/L    Chloride 100 97 - 108 mmol/L    CO2 29 21 - 32 mmol/L    Anion gap 8 5 - 15 mmol/L    Glucose 163 (H) 65 - 100 mg/dL    BUN 29 (H) 6 - 20 MG/DL    Creatinine 1.16 0.70 - 1.30 MG/DL    BUN/Creatinine ratio 25 (H) 12 - 20      GFR est AA >60 >60 ml/min/1.73m2    GFR est non-AA >60 >60 ml/min/1.73m2    Calcium 10.0 8.5 - 10.1 MG/DL    Bilirubin, total 0.7 0.2 - 1.0 MG/DL    ALT (SGPT) 31 12 - 78 U/L    AST (SGOT) 25 15 - 37 U/L    Alk.  phosphatase 53 45 - 117 U/L    Protein, total 7.0 6.4 - 8.2 g/dL    Albumin 3.8 3.5 - 5.0 g/dL    Globulin 3.2 2.0 - 4.0 g/dL    A-G Ratio 1.2 1.1 - 2.2     CBC WITH AUTOMATED DIFF   Result Value Ref Range    WBC 13.8 (H) 4.1 - 11.1 K/uL    RBC 4.77 4.10 - 5.70 M/uL    HGB 13.5 12.1 - 17.0 g/dL    HCT 41.4 36.6 - 50.3 %    MCV 86.8 80.0 - 99.0 FL    MCH 28.3 26.0 - 34.0 PG    MCHC 32.6 30.0 - 36.5 g/dL    RDW 13.2 11.5 - 14.5 %    PLATELET 963 967 - 495 K/uL    MPV 11.1 8.9 - 12.9 FL    NRBC 0.0 0  WBC    ABSOLUTE NRBC 0.00 0.00 - 0.01 K/uL    NEUTROPHILS 89 (H) 32 - 75 %    LYMPHOCYTES 5 (L) 12 - 49 %    MONOCYTES 5 5 - 13 %    EOSINOPHILS 0 0 - 7 %    BASOPHILS 0 0 - 1 %    IMMATURE GRANULOCYTES 1 (H) 0.0 - 0.5 %    ABS. NEUTROPHILS 12.3 (H) 1.8 - 8.0 K/UL    ABS. LYMPHOCYTES 0.7 (L) 0.8 - 3.5 K/UL    ABS. MONOCYTES 0.7 0.0 - 1.0 K/UL    ABS. EOSINOPHILS 0.0 0.0 - 0.4 K/UL    ABS. BASOPHILS 0.0 0.0 - 0.1 K/UL    ABS. IMM. GRANS. 0.1 (H) 0.00 - 0.04 K/UL    DF SMEAR SCANNED      RBC COMMENTS NORMOCYTIC, NORMOCHROMIC     CK W/ REFLX CKMB   Result Value Ref Range     (H) 39 - 308 U/L   CK-MB,QUANT. Result Value Ref Range    CK - MB 17.8 (H) <3.6 NG/ML    CK-MB Index 4.3 (H) 0.0 - 2.5         Orders Placed This Encounter    atomoxetine (Strattera) 25 mg capsule     Sig: Take 1 Capsule by mouth daily. Dispense:  30 Capsule     Refill:  5       1. Late onset Alzheimer's dementia without behavioral disturbance ([facility] [address]    2. Attention deficit hyperactivity disorder (ADHD), predominantly inattentive type      Keep Namenda 10 mg BID   Keep Aricept 10 mg nightly. Add Strattera 25 mg daily to help with motivation, mood. Discussed this in full   Given information about process. Needs to increase mental activity.                This note will not be viewable in [facility]

## 2022-07-14 DIAGNOSIS — F90.0 ATTENTION DEFICIT HYPERACTIVITY DISORDER (ADHD), PREDOMINANTLY INATTENTIVE TYPE: ICD-10-CM

## 2022-07-14 RX ORDER — ATOMOXETINE 25 MG/1
25 CAPSULE ORAL DAILY
Qty: 30 CAPSULE | Refills: 5 | Status: SHIPPED | OUTPATIENT
Start: 2022-07-14 | End: 2022-07-15 | Stop reason: SDUPTHER

## 2022-07-15 DIAGNOSIS — F90.0 ATTENTION DEFICIT HYPERACTIVITY DISORDER (ADHD), PREDOMINANTLY INATTENTIVE TYPE: ICD-10-CM

## 2022-07-15 RX ORDER — ATOMOXETINE 25 MG/1
25 CAPSULE ORAL DAILY
Qty: 90 CAPSULE | Refills: 1 | Status: SHIPPED | OUTPATIENT
Start: 2022-07-15 | End: 2022-10-13

## 2022-08-04 ENCOUNTER — OFFICE VISIT (OUTPATIENT)
Dept: NEUROLOGY | Age: 82
End: 2022-08-04
Payer: MEDICARE

## 2022-08-04 VITALS
BODY MASS INDEX: 22.33 KG/M2 | HEIGHT: 70 IN | SYSTOLIC BLOOD PRESSURE: 106 MMHG | WEIGHT: 156 LBS | DIASTOLIC BLOOD PRESSURE: 70 MMHG | RESPIRATION RATE: 20 BRPM

## 2022-08-04 DIAGNOSIS — F02.80 LATE ONSET ALZHEIMER'S DEMENTIA WITHOUT BEHAVIORAL DISTURBANCE (HCC): Primary | ICD-10-CM

## 2022-08-04 DIAGNOSIS — G30.1 LATE ONSET ALZHEIMER'S DEMENTIA WITHOUT BEHAVIORAL DISTURBANCE (HCC): Primary | ICD-10-CM

## 2022-08-04 PROCEDURE — G8754 DIAS BP LESS 90: HCPCS | Performed by: NURSE PRACTITIONER

## 2022-08-04 PROCEDURE — G8432 DEP SCR NOT DOC, RNG: HCPCS | Performed by: NURSE PRACTITIONER

## 2022-08-04 PROCEDURE — 1101F PT FALLS ASSESS-DOCD LE1/YR: CPT | Performed by: NURSE PRACTITIONER

## 2022-08-04 PROCEDURE — G8752 SYS BP LESS 140: HCPCS | Performed by: NURSE PRACTITIONER

## 2022-08-04 PROCEDURE — G8536 NO DOC ELDER MAL SCRN: HCPCS | Performed by: NURSE PRACTITIONER

## 2022-08-04 PROCEDURE — G8420 CALC BMI NORM PARAMETERS: HCPCS | Performed by: NURSE PRACTITIONER

## 2022-08-04 PROCEDURE — G8427 DOCREV CUR MEDS BY ELIG CLIN: HCPCS | Performed by: NURSE PRACTITIONER

## 2022-08-04 PROCEDURE — 1123F ACP DISCUSS/DSCN MKR DOCD: CPT | Performed by: NURSE PRACTITIONER

## 2022-08-04 PROCEDURE — 99214 OFFICE O/P EST MOD 30 MIN: CPT | Performed by: NURSE PRACTITIONER

## 2022-08-04 NOTE — PROGRESS NOTES
Donna Terry is a [de-identified] y.o. male who presents with the following  Chief Complaint   Patient presents with    Alzheimers     Following for Alzheimers. Pt has no complaints. HPI      FU with wife for dementia. Aricept 10 mg   Namenda 10 mg BID  No changes in memory  Does feel like he has less paranoia with Strattera. 25 mg daily. He is still having day to day memory concerns though but feeling better. He will need help with remembering pills, feeding the cats  Wife cooks and drives. He is sleeping well  No agitation or aggression  He will watch tv   This can happen day to day  He likes to watch the news  Does like to get outside and walk some but not much  Trying to do puzzles, games. Wife states she is encouraging     No Known Allergies    Current Outpatient Medications   Medication Sig    atomoxetine (Strattera) 25 mg capsule Take 1 Capsule by mouth daily for 90 days. memantine (NAMENDA) 10 mg tablet TAKE 1 TABLET BY MOUTH TWO TIMES A DAY. donepeziL (Aricept) 10 mg tablet Take 1 Tablet by mouth daily. valsartan-hydroCHLOROthiazide (DIOVAN-HCT) 160-12.5 mg per tablet Take 1 Tab by mouth daily. omega-3 fatty acids-vitamin e 1,000 mg cap Take  by mouth two (2) times a day. atorvastatin (LIPITOR) 20 mg tablet Take 20 mg by mouth daily. metFORMIN (GLUCOPHAGE) 1,000 mg tablet Take 1,000 mg by mouth two (2) times a day. No current facility-administered medications for this visit.        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 No pertinent family history. Social History     Socioeconomic History    Marital status:    Tobacco Use    Smoking status: Never    Smokeless tobacco: Never   Substance and Sexual Activity    Alcohol use: Not Currently     Comment: occassional    Drug use: Never       Review of Systems   Eyes:  Negative for blurred vision, double vision and photophobia. Respiratory:  Negative for shortness of breath and wheezing. Cardiovascular:  Negative for chest pain and palpitations. Gastrointestinal:  Negative for nausea and vomiting. Musculoskeletal:  Negative for falls. Neurological:  Negative for dizziness, tingling, seizures, loss of consciousness and headaches. Psychiatric/Behavioral:  Positive for memory loss. The patient is not nervous/anxious and does not have insomnia. Remainder of comprehensive review is negative. Physical Exam :    Visit Vitals  /70   Resp 20   Ht 5' 10\" (1.778 m)   Wt 70.8 kg (156 lb)   BMI 22.38 kg/m²       General: Well defined, nourished, and groomed individual in no acute distress. Neck: Supple, nontender, no bruits, no pain with resistance to active range of motion. Musculoskeletal: Extremities revealed no edema and had full range of motion of joints. Psych: Good mood and bright affect    NEUROLOGICAL EXAMINATION:    Mental Status: Alert and oriented to person, place, and time. Mmse 27     Cranial Nerves:    II, III, IV, VI: Visual acuity grossly intact. Visual fields are normal.    Pupils are equal, round, and reactive to light and accommodation. Extra-ocular movements are full and fluid. Fundoscopic exam was benign, no ptosis or nystagmus. V-XII: Hearing is grossly intact.  Facial features are symmetric, with normal sensation and strength. The palate rises symmetrically and the tongue protrudes midline. Sternocleidomastoids 5/5. Motor Examination: Normal tone, bulk, and strength, 5/5 muscle strength throughout. Coordination: Finger to nose was normal. No resting or intention tremor    Gait and Station: Steady while walking. Normal arm swing. No pronator drift. No muscle wasting or fasiculations noted. Reflexes: DTRs 2+ throughout. Results for orders placed or performed during the hospital encounter of 11/11/21   SAMPLES BEING HELD   Result Value Ref Range    SAMPLES BEING HELD 1SST,1DRK GRN,1RED     COMMENT        Add-on orders for these samples will be processed based on acceptable specimen integrity and analyte stability, which may vary by analyte. METABOLIC PANEL, COMPREHENSIVE   Result Value Ref Range    Sodium 137 136 - 145 mmol/L    Potassium 3.6 3.5 - 5.1 mmol/L    Chloride 100 97 - 108 mmol/L    CO2 29 21 - 32 mmol/L    Anion gap 8 5 - 15 mmol/L    Glucose 163 (H) 65 - 100 mg/dL    BUN 29 (H) 6 - 20 MG/DL    Creatinine 1.16 0.70 - 1.30 MG/DL    BUN/Creatinine ratio 25 (H) 12 - 20      GFR est AA >60 >60 ml/min/1.73m2    GFR est non-AA >60 >60 ml/min/1.73m2    Calcium 10.0 8.5 - 10.1 MG/DL    Bilirubin, total 0.7 0.2 - 1.0 MG/DL    ALT (SGPT) 31 12 - 78 U/L    AST (SGOT) 25 15 - 37 U/L    Alk.  phosphatase 53 45 - 117 U/L    Protein, total 7.0 6.4 - 8.2 g/dL    Albumin 3.8 3.5 - 5.0 g/dL    Globulin 3.2 2.0 - 4.0 g/dL    A-G Ratio 1.2 1.1 - 2.2     CBC WITH AUTOMATED DIFF   Result Value Ref Range    WBC 13.8 (H) 4.1 - 11.1 K/uL    RBC 4.77 4.10 - 5.70 M/uL    HGB 13.5 12.1 - 17.0 g/dL    HCT 41.4 36.6 - 50.3 %    MCV 86.8 80.0 - 99.0 FL    MCH 28.3 26.0 - 34.0 PG    MCHC 32.6 30.0 - 36.5 g/dL    RDW 13.2 11.5 - 14.5 %    PLATELET 784 485 - 307 K/uL    MPV 11.1 8.9 - 12.9 FL    NRBC 0.0 0  WBC    ABSOLUTE NRBC 0.00 0.00 - 0.01 K/uL    NEUTROPHILS 89 (H) 32 - 75 % LYMPHOCYTES 5 (L) 12 - 49 %    MONOCYTES 5 5 - 13 %    EOSINOPHILS 0 0 - 7 %    BASOPHILS 0 0 - 1 %    IMMATURE GRANULOCYTES 1 (H) 0.0 - 0.5 %    ABS. NEUTROPHILS 12.3 (H) 1.8 - 8.0 K/UL    ABS. LYMPHOCYTES 0.7 (L) 0.8 - 3.5 K/UL    ABS. MONOCYTES 0.7 0.0 - 1.0 K/UL    ABS. EOSINOPHILS 0.0 0.0 - 0.4 K/UL    ABS. BASOPHILS 0.0 0.0 - 0.1 K/UL    ABS. IMM. GRANS. 0.1 (H) 0.00 - 0.04 K/UL    DF SMEAR SCANNED      RBC COMMENTS NORMOCYTIC, NORMOCHROMIC     CK W/ REFLX CKMB   Result Value Ref Range     (H) 39 - 308 U/L   CK-MB,QUANT. Result Value Ref Range    CK - MB 17.8 (H) <3.6 NG/ML    CK-MB Index 4.3 (H) 0.0 - 2.5         No orders of the defined types were placed in this encounter. 1. Late onset Alzheimer's dementia without behavioral disturbance (HCC)        AD   Keep Namenda 10 mg BID   Aricept 10 mg daily. Continue Strattera 25 mg daily. Wife states he is increasing cognitive games but needs to continue   Not driving   Do need a placard to help with parking, staying close.    FU  4 months               This note will not be viewable in [employer] [employer]

## 2022-08-04 NOTE — PROGRESS NOTES
Chief Complaint   Patient presents with    Alzheimers     Following for Alzheimers. Pt has no complaints.         Visit Vitals  Resp 20   Ht 5' 10\" (1.778 m)   Wt 156 lb (70.8 kg)   BMI 22.38 kg/m²

## 2022-08-17 ENCOUNTER — HOSPITAL ENCOUNTER (OUTPATIENT)
Dept: RADIATION THERAPY | Age: 82
Discharge: HOME | End: 2022-08-17

## 2022-08-18 DIAGNOSIS — F02.80 LATE ONSET ALZHEIMER'S DEMENTIA WITHOUT BEHAVIORAL DISTURBANCE (HCC): ICD-10-CM

## 2022-08-18 DIAGNOSIS — G30.1 LATE ONSET ALZHEIMER'S DEMENTIA WITHOUT BEHAVIORAL DISTURBANCE (HCC): ICD-10-CM

## 2022-08-18 RX ORDER — MEMANTINE HYDROCHLORIDE 10 MG/1
TABLET ORAL
Qty: 180 TABLET | Refills: 0 | Status: SHIPPED | OUTPATIENT
Start: 2022-08-18

## 2022-11-13 DIAGNOSIS — G30.1 LATE ONSET ALZHEIMER'S DEMENTIA WITHOUT BEHAVIORAL DISTURBANCE (HCC): ICD-10-CM

## 2022-11-13 DIAGNOSIS — F02.80 LATE ONSET ALZHEIMER'S DEMENTIA WITHOUT BEHAVIORAL DISTURBANCE (HCC): ICD-10-CM

## 2022-11-13 RX ORDER — MEMANTINE HYDROCHLORIDE 10 MG/1
TABLET ORAL
Qty: 180 TABLET | Refills: 0 | Status: SHIPPED | OUTPATIENT
Start: 2022-11-13

## 2023-01-03 DIAGNOSIS — G30.1 LATE ONSET ALZHEIMER'S DEMENTIA WITHOUT BEHAVIORAL DISTURBANCE (HCC): ICD-10-CM

## 2023-01-03 DIAGNOSIS — F02.80 LATE ONSET ALZHEIMER'S DEMENTIA WITHOUT BEHAVIORAL DISTURBANCE (HCC): ICD-10-CM

## 2023-01-04 RX ORDER — MEMANTINE HYDROCHLORIDE 10 MG/1
TABLET ORAL
Qty: 180 TABLET | Refills: 0 | Status: SHIPPED | OUTPATIENT
Start: 2023-01-04

## 2023-01-13 DIAGNOSIS — F90.0 ATTENTION DEFICIT HYPERACTIVITY DISORDER (ADHD), PREDOMINANTLY INATTENTIVE TYPE: ICD-10-CM

## 2023-01-13 RX ORDER — ATOMOXETINE 25 MG/1
CAPSULE ORAL
Qty: 90 CAPSULE | Refills: 1 | Status: SHIPPED | OUTPATIENT
Start: 2023-01-13

## 2023-02-18 ENCOUNTER — HOSPITAL ENCOUNTER (EMERGENCY)
Age: 83
Discharge: HOME | End: 2023-02-18
Attending: EMERGENCY MEDICINE
Payer: MEDICARE

## 2023-02-18 ENCOUNTER — APPOINTMENT (OUTPATIENT)
Dept: GENERAL RADIOLOGY | Age: 83
End: 2023-02-18
Attending: EMERGENCY MEDICINE
Payer: MEDICARE

## 2023-02-18 VITALS
OXYGEN SATURATION: 100 % | DIASTOLIC BLOOD PRESSURE: 80 MMHG | TEMPERATURE: 97.9 F | HEIGHT: 70 IN | BODY MASS INDEX: 21.47 KG/M2 | HEART RATE: 91 BPM | RESPIRATION RATE: 12 BRPM | SYSTOLIC BLOOD PRESSURE: 122 MMHG | WEIGHT: 150 LBS

## 2023-02-18 DIAGNOSIS — E86.0 DEHYDRATION: Primary | ICD-10-CM

## 2023-02-18 LAB
ALBUMIN SERPL-MCNC: 3.6 G/DL (ref 3.5–5)
ALBUMIN/GLOB SERPL: 1.1 (ref 1.1–2.2)
ALP SERPL-CCNC: 58 U/L (ref 45–117)
ALT SERPL-CCNC: 23 U/L (ref 12–78)
ANION GAP SERPL CALC-SCNC: 3 MMOL/L (ref 5–15)
APPEARANCE UR: CLEAR
AST SERPL-CCNC: 20 U/L (ref 15–37)
ATRIAL RATE: 95 BPM
BASOPHILS # BLD: 0 K/UL (ref 0–0.1)
BASOPHILS NFR BLD: 0 % (ref 0–1)
BILIRUB SERPL-MCNC: 0.7 MG/DL (ref 0.2–1)
BILIRUB UR QL: NEGATIVE
BUN SERPL-MCNC: 30 MG/DL (ref 6–20)
BUN/CREAT SERPL: 33 (ref 12–20)
CALCIUM SERPL-MCNC: 9.3 MG/DL (ref 8.5–10.1)
CALCULATED P AXIS, ECG09: 88 DEGREES
CALCULATED R AXIS, ECG10: -28 DEGREES
CALCULATED T AXIS, ECG11: 87 DEGREES
CHLORIDE SERPL-SCNC: 99 MMOL/L (ref 97–108)
CO2 SERPL-SCNC: 32 MMOL/L (ref 21–32)
COLOR UR: ABNORMAL
COMMENT, HOLDF: NORMAL
CREAT SERPL-MCNC: 0.9 MG/DL (ref 0.7–1.3)
DIAGNOSIS, 93000: NORMAL
DIFFERENTIAL METHOD BLD: ABNORMAL
EOSINOPHIL # BLD: 0 K/UL (ref 0–0.4)
EOSINOPHIL NFR BLD: 0 % (ref 0–7)
ERYTHROCYTE [DISTWIDTH] IN BLOOD BY AUTOMATED COUNT: 13.1 % (ref 11.5–14.5)
GLOBULIN SER CALC-MCNC: 3.4 G/DL (ref 2–4)
GLUCOSE SERPL-MCNC: 118 MG/DL (ref 65–100)
GLUCOSE UR STRIP.AUTO-MCNC: 100 MG/DL
HCT VFR BLD AUTO: 39 % (ref 36.6–50.3)
HEMOCCULT STL QL: NEGATIVE
HGB BLD-MCNC: 13 G/DL (ref 12.1–17)
HGB UR QL STRIP: NEGATIVE
IMM GRANULOCYTES # BLD AUTO: 0 K/UL (ref 0–0.04)
IMM GRANULOCYTES NFR BLD AUTO: 0 % (ref 0–0.5)
KETONES UR QL STRIP.AUTO: ABNORMAL MG/DL
LACTATE SERPL-SCNC: 1.6 MMOL/L (ref 0.4–2)
LEUKOCYTE ESTERASE UR QL STRIP.AUTO: NEGATIVE
LYMPHOCYTES # BLD: 1 K/UL (ref 0.8–3.5)
LYMPHOCYTES NFR BLD: 11 % (ref 12–49)
MCH RBC QN AUTO: 29.4 PG (ref 26–34)
MCHC RBC AUTO-ENTMCNC: 33.3 G/DL (ref 30–36.5)
MCV RBC AUTO: 88.2 FL (ref 80–99)
MONOCYTES # BLD: 0.6 K/UL (ref 0–1)
MONOCYTES NFR BLD: 6 % (ref 5–13)
NEUTS SEG # BLD: 7.7 K/UL (ref 1.8–8)
NEUTS SEG NFR BLD: 83 % (ref 32–75)
NITRITE UR QL STRIP.AUTO: NEGATIVE
NRBC # BLD: 0 K/UL (ref 0–0.01)
NRBC BLD-RTO: 0 PER 100 WBC
P-R INTERVAL, ECG05: 192 MS
PH UR STRIP: 5 (ref 5–8)
PLATELET # BLD AUTO: 185 K/UL (ref 150–400)
PMV BLD AUTO: 11 FL (ref 8.9–12.9)
POTASSIUM SERPL-SCNC: 4.3 MMOL/L (ref 3.5–5.1)
PROT SERPL-MCNC: 7 G/DL (ref 6.4–8.2)
PROT UR STRIP-MCNC: NEGATIVE MG/DL
Q-T INTERVAL, ECG07: 344 MS
QRS DURATION, ECG06: 78 MS
QTC CALCULATION (BEZET), ECG08: 432 MS
RBC # BLD AUTO: 4.42 M/UL (ref 4.1–5.7)
SAMPLES BEING HELD,HOLD: NORMAL
SODIUM SERPL-SCNC: 134 MMOL/L (ref 136–145)
SP GR UR REFRACTOMETRY: 1.02 (ref 1–1.03)
UROBILINOGEN UR QL STRIP.AUTO: 1 EU/DL (ref 0.2–1)
VENTRICULAR RATE, ECG03: 95 BPM
WBC # BLD AUTO: 9.3 K/UL (ref 4.1–11.1)

## 2023-02-18 PROCEDURE — 96360 HYDRATION IV INFUSION INIT: CPT

## 2023-02-18 PROCEDURE — 99285 EMERGENCY DEPT VISIT HI MDM: CPT

## 2023-02-18 PROCEDURE — 96361 HYDRATE IV INFUSION ADD-ON: CPT

## 2023-02-18 PROCEDURE — 85025 COMPLETE CBC W/AUTO DIFF WBC: CPT

## 2023-02-18 PROCEDURE — 71045 X-RAY EXAM CHEST 1 VIEW: CPT

## 2023-02-18 PROCEDURE — 80053 COMPREHEN METABOLIC PANEL: CPT

## 2023-02-18 PROCEDURE — 81003 URINALYSIS AUTO W/O SCOPE: CPT

## 2023-02-18 PROCEDURE — 93005 ELECTROCARDIOGRAM TRACING: CPT

## 2023-02-18 PROCEDURE — 36415 COLL VENOUS BLD VENIPUNCTURE: CPT

## 2023-02-18 PROCEDURE — 82272 OCCULT BLD FECES 1-3 TESTS: CPT

## 2023-02-18 PROCEDURE — 83605 ASSAY OF LACTIC ACID: CPT

## 2023-02-18 PROCEDURE — 51798 US URINE CAPACITY MEASURE: CPT

## 2023-02-18 PROCEDURE — 74011250636 HC RX REV CODE- 250/636: Performed by: EMERGENCY MEDICINE

## 2023-02-18 RX ADMIN — SODIUM CHLORIDE 1000 ML: 9 INJECTION, SOLUTION INTRAVENOUS at 11:14

## 2023-02-18 NOTE — ED PROVIDER NOTES
82M w/ hx Alz dementia, prostate cancer, CAD s/p CABG, DM, HTN p/w fatigue for past 1wk. Pt here from home. Has had extreme fatigue and whole body weakness w/ very poor PO intake. Pt notes has been eating/drinking very little and having decreased UOP. Also losing weight. Notes copious loose stools w/ blood. No F/C, cough, dyspnea, N/V, dysuria/hematuria. Denies any pain anywhere. Wife states she is only interested in pt living at home and not SNF or rehab facility. Past Medical History:   Diagnosis Date    CAD (coronary artery disease), native coronary artery 11/18/2010    Cancer ([facility] [address] 03/2018    prostate cancer treatment to be radioactive seed implants     Essential hypertension, benign 11/18/2010    Obesity 11/18/2010    S/P CABG (status post coronary artery bypass graft) 11/18/2010    Type II or unspecified type diabetes mellitus without mention of complication, not stated as uncontrolled 11/18/2010       Past Surgical History:   Procedure Laterality Date    COLONOSCOPY N/A 3/13/2018    COLONOSCOPY performed by Melissa Doll MD at [facility] ENDOSCOPY    HX TONSILLECTOMY      CT CORONARY ARTERY BYP W/VEIN & ARTERY GRAFT 3 VEIN  2003         History reviewed. No pertinent family history.     Social History     Socioeconomic History    Marital status:      Spouse name: Not on file    Number of children: Not on file    Years of education: Not on file    Highest education level: Not on file   Occupational History    Not on file   Tobacco Use    Smoking status: Never    Smokeless tobacco: Never   Substance and Sexual Activity    Alcohol use: Not Currently     Comment: occassional    Drug use: Never    Sexual activity: Not on file   Other Topics Concern    Not on file   Social History Narrative    Not on file     Social Determinants of Health     Financial Resource Strain: Not on file   Food Insecurity: Not on file   Transportation Needs: Not on file   Physical Activity: Not on file   Stress: Not on file   Social Connections: Not on file   Intimate Partner Violence: Not on file   Housing Stability: Not on file         ALLERGIES: Patient has no known allergies. Review of Systems   Constitutional:  Positive for fatigue. Negative for chills, diaphoresis and fever. HENT:  Negative for facial swelling, mouth sores, nosebleeds, trouble swallowing and voice change. Eyes:  Negative for pain and visual disturbance. Respiratory:  Negative for apnea, cough, shortness of breath, wheezing and stridor. Cardiovascular:  Negative for chest pain, palpitations and leg swelling. Gastrointestinal:  Positive for blood in stool and diarrhea. Negative for abdominal distention, abdominal pain, nausea and vomiting. Genitourinary:  Negative for difficulty urinating, dysuria, flank pain, hematuria, scrotal swelling and testicular pain. Musculoskeletal:  Negative for joint swelling. Skin:  Negative for color change and rash. Allergic/Immunologic: Negative for immunocompromised state. Neurological:  Negative for dizziness, syncope and light-headedness. Hematological:  Does not bruise/bleed easily. Psychiatric/Behavioral:  Negative for agitation and behavioral problems. Vitals:    02/18/23 1053 02/18/23 1311   BP: 90/67 122/80   Pulse: 91    Resp: 12    Temp: 97.9 °F (36.6 °C)    SpO2: 100%    Weight: 68 kg (150 lb)    Height: 5' 10\" (1.778 m)             Physical Exam  Vitals and nursing note reviewed. Constitutional:       General: He is not in acute distress. Appearance: Normal appearance. He is not ill-appearing or toxic-appearing. Comments: Cachectic  Dry MM  Chronically ill appearing   HENT:      Head: Normocephalic and atraumatic. Right Ear: External ear normal.      Left Ear: External ear normal.      Nose: Nose normal.      Mouth/Throat:      Mouth: Mucous membranes are dry. Pharynx: Oropharynx is clear. No oropharyngeal exudate or posterior oropharyngeal erythema.    Eyes:      General: No scleral icterus. Extraocular Movements: Extraocular movements intact. Conjunctiva/sclera: Conjunctivae normal.      Pupils: Pupils are equal, round, and reactive to light. Cardiovascular:      Rate and Rhythm: Normal rate and regular rhythm. Pulses: Normal pulses. Heart sounds: No murmur heard. No friction rub. No gallop. Pulmonary:      Effort: Pulmonary effort is normal. No respiratory distress. Breath sounds: Normal breath sounds. No stridor. No wheezing, rhonchi or rales. Abdominal:      General: Bowel sounds are normal. There is no distension. Palpations: Abdomen is soft. Tenderness: There is no abdominal tenderness. There is no guarding or rebound. Comments: scaphoid   Genitourinary:     Comments: No gross blood or melena  Musculoskeletal:         General: No deformity. Normal range of motion. Cervical back: Normal range of motion and neck supple. No rigidity. Right lower leg: No edema. Left lower leg: No edema. Skin:     General: Skin is warm. Capillary Refill: Capillary refill takes less than 2 seconds. Coloration: Skin is not jaundiced. Neurological:      General: No focal deficit present. Mental Status: He is alert. Cranial Nerves: No cranial nerve deficit. Sensory: No sensory deficit. Motor: No weakness. Coordination: Coordination normal.   Psychiatric:         Mood and Affect: Mood normal.         Behavior: Behavior normal.         Thought Content: Thought content normal.         Judgment: Judgment normal.        I personally reviewed and independently interpreted EKG, labs and imaging results.     EKG Interpretation   SR, narrow QRS, nl intervals, no PENELOPE/STD/TWI    LABORATORY TESTS:  Admission on 02/18/2023, Discharged on 02/18/2023   Component Date Value Ref Range Status    WBC 02/18/2023 9.3  4.1 - 11.1 K/uL Final    RBC 02/18/2023 4.42  4.10 - 5.70 M/uL Final    HGB 02/18/2023 13.0  12.1 - 17.0 g/dL Final    HCT 02/18/2023 39.0  36.6 - 50.3 % Final    MCV 02/18/2023 88.2  80.0 - 99.0 FL Final    MCH 02/18/2023 29.4  26.0 - 34.0 PG Final    MCHC 02/18/2023 33.3  30.0 - 36.5 g/dL Final    RDW 02/18/2023 13.1  11.5 - 14.5 % Final    PLATELET 98/44/2970 704  150 - 400 K/uL Final    MPV 02/18/2023 11.0  8.9 - 12.9 FL Final    NRBC 02/18/2023 0.0  0  WBC Final    ABSOLUTE NRBC 02/18/2023 0.00  0.00 - 0.01 K/uL Final    NEUTROPHILS 02/18/2023 83 (A)  32 - 75 % Final    LYMPHOCYTES 02/18/2023 11 (A)  12 - 49 % Final    MONOCYTES 02/18/2023 6  5 - 13 % Final    EOSINOPHILS 02/18/2023 0  0 - 7 % Final    BASOPHILS 02/18/2023 0  0 - 1 % Final    IMMATURE GRANULOCYTES 02/18/2023 0  0.0 - 0.5 % Final    ABS. NEUTROPHILS 02/18/2023 7.7  1.8 - 8.0 K/UL Final    ABS. LYMPHOCYTES 02/18/2023 1.0  0.8 - 3.5 K/UL Final    ABS. MONOCYTES 02/18/2023 0.6  0.0 - 1.0 K/UL Final    ABS. EOSINOPHILS 02/18/2023 0.0  0.0 - 0.4 K/UL Final    ABS. BASOPHILS 02/18/2023 0.0  0.0 - 0.1 K/UL Final    ABS. IMM. GRANS. 02/18/2023 0.0  0.00 - 0.04 K/UL Final    DF 02/18/2023 AUTOMATED    Final    Sodium 02/18/2023 134 (A)  136 - 145 mmol/L Final    Potassium 02/18/2023 4.3  3.5 - 5.1 mmol/L Final    Chloride 02/18/2023 99  97 - 108 mmol/L Final    CO2 02/18/2023 32  21 - 32 mmol/L Final    Anion gap 02/18/2023 3 (A)  5 - 15 mmol/L Final    Glucose 02/18/2023 118 (A)  65 - 100 mg/dL Final    BUN 02/18/2023 30 (A)  6 - 20 MG/DL Final    Creatinine 02/18/2023 0.90  0.70 - 1.30 MG/DL Final    BUN/Creatinine ratio 02/18/2023 33 (A)  12 - 20   Final    eGFR 02/18/2023 >60  >60 ml/min/1.73m2 Final    Comment:      Pediatric calculator link: [employer].at. org/professionals/kdoqi/gfr_calculatorped       These results are not intended for use in patients <25years of age. eGFR results are calculated without a race factor using  the 2021 CKD-EPI equation.  Careful clinical correlation is recommended, particularly when comparing to results calculated using previous equations. The CKD-EPI equation is less accurate in patients with extremes of muscle mass, extra-renal metabolism of creatinine, excessive creatine ingestion, or following therapy that affects renal tubular secretion. Calcium 02/18/2023 9.3  8.5 - 10.1 MG/DL Final    Bilirubin, total 02/18/2023 0.7  0.2 - 1.0 MG/DL Final    ALT (SGPT) 02/18/2023 23  12 - 78 U/L Final    AST (SGOT) 02/18/2023 20  15 - 37 U/L Final    Alk. phosphatase 02/18/2023 58  45 - 117 U/L Final    Protein, total 02/18/2023 7.0  6.4 - 8.2 g/dL Final    Albumin 02/18/2023 3.6  3.5 - 5.0 g/dL Final    Globulin 02/18/2023 3.4  2.0 - 4.0 g/dL Final    A-G Ratio 02/18/2023 1.1  1.1 - 2.2   Final    SAMPLES BEING HELD 02/18/2023 [facility]   Final    COMMENT 02/18/2023 Add-on orders for these samples will be processed based on acceptable specimen integrity and analyte stability, which may vary by analyte.     Final    Ventricular Rate 02/18/2023 95  BPM Final    Atrial Rate 02/18/2023 95  BPM Final    P-R Interval 02/18/2023 192  ms Final    QRS Duration 02/18/2023 78  ms Final    Q-T Interval 02/18/2023 344  ms Final    QTC Calculation (Bezet) 02/18/2023 432  ms Final    Calculated P Axis 02/18/2023 88  degrees Final    Calculated R Axis 02/18/2023 -28  degrees Final    Calculated T Axis 02/18/2023 87  degrees Final    Diagnosis 02/18/2023    Final                    Value:Sinus rhythm with marked sinus arrhythmia with occasional premature   ventricular complexes  Inferior infarct , age undetermined  Abnormal ECG  No previous ECGs available  Confirmed by Erendira Hayden MD. ([id number]) on 2/18/2023 10:10:00 PM      Occult blood, stool 02/18/2023 Negative  NEG   Final    Lactic acid 02/18/2023 1.6  0.4 - 2.0 MMOL/L Final    Color 02/18/2023 YELLOW/STRAW    Final    Color Reference Range: Straw, Yellow or Dark Yellow    Appearance 02/18/2023 CLEAR  CLEAR   Final    Specific gravity 02/18/2023 1.025  1.003 - 1.030   Final pH (UA) 02/18/2023 5.0  5.0 - 8.0   Final    Protein 02/18/2023 Negative  NEG mg/dL Final    Glucose 02/18/2023 100 (A)  NEG mg/dL Final    Ketone 02/18/2023 TRACE (A)  NEG mg/dL Final    Bilirubin 02/18/2023 Negative  NEG   Final    Blood 02/18/2023 Negative  NEG   Final    Urobilinogen 02/18/2023 1.0  0.2 - 1.0 EU/dL Final    Nitrites 02/18/2023 Negative  NEG   Final    Leukocyte Esterase 02/18/2023 Negative  NEG   Final       IMAGING RESULTS:  XR CHEST PORT   Final Result      No acute process on portable chest.             MEDICATIONS GIVEN:  Medications   sodium chloride 0.9 % bolus infusion 1,000 mL (0 mL IntraVENous IV Completed 2/18/23 1402)       IMPRESSION:  1. Dehydration        PLAN:  - Discharge    Aroldo Tony MD      Medical Decision Making  82M w/ hx Alz dementia, prostate cancer, CAD s/p CABG, DM, HTN p/w generalized weakness, fatigue, diarrhea for past 1wk. Looks deconditioned and chronically ill. Afebrile, initially soft BP, no resp distress or hypoxia. Hemoccult neg. Ddx include cardiogenic (AV block, dysrythmia, cardiomyopathy, less likely ACS) vs electrolyte/metabolic vs severe anemia/GIB vs infectious process vs hypovolemia/dehydration vs vasovagal/neurogenic vs seizure vs polypharmacy/tox, less likely acute vestibular syndrome or acute intracranial process [facility], [facility], CV[facility]) based on nonfocal neuro exam and no AMS. Ordered CXR, EKG, labs, UA. Give IVF NSS bolus. Monitor and reassess. 1400 Pt more awake and alert. BP improved 120/70. UA not suggestive of infection. Labs unremarkable including lytes and hgb. Pt able to stand and ambulate w/o issue. Unable to provide stool sample for C dif testing. CXR clear. EKG SR w/o ischemia, dysrhythmia or high grade heart block. Long conversation w/ wife and pt at bedside. Offered admission for placement in SNF or rehab but family declined and feels strongly about taking him home.     Patient given specific return precautions and explained signs/symptoms for which to come back to ED immediately but otherwise advised to f/u w/ PCP over next 2-3days. Amount and/or Complexity of Data Reviewed  Independent Historian: spouse  External Data Reviewed: notes. Labs: ordered. Decision-making details documented in ED Course. Radiology: ordered and independent interpretation performed. Decision-making details documented in ED Course. ECG/medicine tests: ordered and independent interpretation performed. Decision-making details documented in ED Course. ED Course as of 02/19/23 1525   Sat Feb 18, 2023   1056 EKG shows sinus rhythm with rate of 95. Normal intervals. No ST elevations or depressions. No arrhythmias noted.  [MM]      ED Course User Index  [MM] Sea Lynch MD       Procedures

## 2023-02-18 NOTE — ED TRIAGE NOTES
Pt arrived via EMS from [facility] c/o generalized fatigue and diarrhea x 1 week. Per EMS, pt was seen for diarrhea yesterday and prescribed medication, however patient refused to take medications, eat, or drink.

## 2023-04-02 RX ORDER — DONEPEZIL HYDROCHLORIDE 10 MG/1
TABLET, FILM COATED ORAL
Qty: 90 TABLET | Refills: 3 | Status: SHIPPED | OUTPATIENT
Start: 2023-04-02

## 2023-05-06 DIAGNOSIS — G30.1 ALZHEIMER'S DISEASE WITH LATE ONSET (CODE) (HCC): ICD-10-CM

## 2023-05-07 ENCOUNTER — TELEPHONE (OUTPATIENT)
Age: 83
End: 2023-05-07

## 2023-05-08 RX ORDER — ATORVASTATIN CALCIUM 20 MG/1
TABLET, FILM COATED ORAL
COMMUNITY
Start: 2023-02-09

## 2023-05-08 RX ORDER — ATOMOXETINE 25 MG/1
CAPSULE ORAL DAILY
COMMUNITY
Start: 2023-04-13

## 2023-05-08 RX ORDER — MEMANTINE HYDROCHLORIDE 10 MG/1
10 TABLET ORAL 2 TIMES DAILY
Qty: 180 TABLET | Refills: 0 | Status: SHIPPED | OUTPATIENT
Start: 2023-05-08

## 2023-05-08 RX ORDER — DONEPEZIL HYDROCHLORIDE 10 MG/1
1 TABLET, FILM COATED ORAL DAILY
COMMUNITY
Start: 2023-04-02

## 2023-05-08 RX ORDER — DIPHENOXYLATE HYDROCHLORIDE AND ATROPINE SULFATE 2.5; .025 MG/1; MG/1
TABLET ORAL
COMMUNITY
Start: 2023-02-17

## 2023-05-08 RX ORDER — VALSARTAN AND HYDROCHLOROTHIAZIDE 160; 12.5 MG/1; MG/1
1 TABLET, FILM COATED ORAL DAILY
COMMUNITY

## 2023-05-08 RX ORDER — MEMANTINE HYDROCHLORIDE 10 MG/1
1 TABLET ORAL 2 TIMES DAILY
COMMUNITY
Start: 2023-01-04

## 2023-07-13 DIAGNOSIS — F90.0 ATTENTION-DEFICIT HYPERACTIVITY DISORDER, PREDOMINANTLY INATTENTIVE TYPE: ICD-10-CM

## 2023-07-13 RX ORDER — ATOMOXETINE 25 MG/1
CAPSULE ORAL
Qty: 90 CAPSULE | Refills: 1 | Status: SHIPPED | OUTPATIENT
Start: 2023-07-13

## 2023-08-07 DIAGNOSIS — G30.1 ALZHEIMER'S DISEASE WITH LATE ONSET (CODE) (HCC): ICD-10-CM

## 2023-08-07 RX ORDER — MEMANTINE HYDROCHLORIDE 10 MG/1
TABLET ORAL
Qty: 180 TABLET | Refills: 0 | OUTPATIENT
Start: 2023-08-07

## 2023-09-11 DIAGNOSIS — G30.1 ALZHEIMER'S DISEASE WITH LATE ONSET (CODE) (HCC): ICD-10-CM

## 2023-09-15 RX ORDER — MEMANTINE HYDROCHLORIDE 10 MG/1
10 TABLET ORAL 2 TIMES DAILY
Qty: 180 TABLET | Refills: 0 | Status: SHIPPED | OUTPATIENT
Start: 2023-09-15

## 2023-10-17 DIAGNOSIS — G30.1 ALZHEIMER'S DISEASE WITH LATE ONSET (CODE) (HCC): ICD-10-CM

## 2023-10-17 RX ORDER — MEMANTINE HYDROCHLORIDE 10 MG/1
10 TABLET ORAL 2 TIMES DAILY
Qty: 180 TABLET | Refills: 0 | Status: SHIPPED | OUTPATIENT
Start: 2023-10-17

## 2024-01-16 DIAGNOSIS — F90.0 ATTENTION-DEFICIT HYPERACTIVITY DISORDER, PREDOMINANTLY INATTENTIVE TYPE: ICD-10-CM

## 2024-01-16 RX ORDER — ATOMOXETINE 25 MG/1
CAPSULE ORAL
Qty: 90 CAPSULE | Refills: 1 | OUTPATIENT
Start: 2024-01-16

## 2024-01-24 ENCOUNTER — TELEPHONE (OUTPATIENT)
Age: 84
End: 2024-01-24

## 2024-02-01 ENCOUNTER — TELEPHONE (OUTPATIENT)
Age: 84
End: 2024-02-01

## 2024-02-01 DIAGNOSIS — F90.0 ATTENTION-DEFICIT HYPERACTIVITY DISORDER, PREDOMINANTLY INATTENTIVE TYPE: ICD-10-CM

## 2024-02-01 NOTE — TELEPHONE ENCOUNTER
Patient's spouse is calling again in regards to patient's medication. States patient has been without medication (Atomoxetine) for 3 weeks now. Patient is scheduled for an appt on 04/16 at 9am    Please contact if needed.

## 2024-02-02 DIAGNOSIS — F90.0 ATTENTION-DEFICIT HYPERACTIVITY DISORDER, PREDOMINANTLY INATTENTIVE TYPE: ICD-10-CM

## 2024-02-02 RX ORDER — ATOMOXETINE 25 MG/1
CAPSULE ORAL
Qty: 30 CAPSULE | Refills: 1 | Status: SHIPPED | OUTPATIENT
Start: 2024-02-02

## 2024-02-02 RX ORDER — ATOMOXETINE 25 MG/1
CAPSULE ORAL
Qty: 90 CAPSULE | Refills: 1 | OUTPATIENT
Start: 2024-02-02

## 2024-03-22 DIAGNOSIS — G30.1 ALZHEIMER'S DISEASE WITH LATE ONSET (CODE) (HCC): ICD-10-CM

## 2024-03-22 RX ORDER — MEMANTINE HYDROCHLORIDE 10 MG/1
10 TABLET ORAL 2 TIMES DAILY
Qty: 180 TABLET | Refills: 0 | Status: SHIPPED | OUTPATIENT
Start: 2024-03-22

## 2024-03-28 DIAGNOSIS — F90.0 ATTENTION-DEFICIT HYPERACTIVITY DISORDER, PREDOMINANTLY INATTENTIVE TYPE: ICD-10-CM

## 2024-03-28 RX ORDER — ATOMOXETINE 25 MG/1
CAPSULE ORAL
Qty: 30 CAPSULE | Refills: 1 | Status: SHIPPED | OUTPATIENT
Start: 2024-03-28

## 2024-04-02 RX ORDER — DONEPEZIL HYDROCHLORIDE 10 MG/1
10 TABLET, FILM COATED ORAL DAILY
Qty: 90 TABLET | Refills: 3 | Status: SHIPPED | OUTPATIENT
Start: 2024-04-02

## 2024-04-16 ENCOUNTER — OFFICE VISIT (OUTPATIENT)
Age: 84
End: 2024-04-16
Payer: COMMERCIAL

## 2024-04-16 VITALS
SYSTOLIC BLOOD PRESSURE: 118 MMHG | HEART RATE: 90 BPM | OXYGEN SATURATION: 98 % | DIASTOLIC BLOOD PRESSURE: 74 MMHG | RESPIRATION RATE: 20 BRPM

## 2024-04-16 DIAGNOSIS — F90.0 ATTENTION-DEFICIT HYPERACTIVITY DISORDER, PREDOMINANTLY INATTENTIVE TYPE: ICD-10-CM

## 2024-04-16 DIAGNOSIS — G30.1 ALZHEIMER'S DISEASE WITH LATE ONSET (CODE) (HCC): Primary | ICD-10-CM

## 2024-04-16 PROCEDURE — 3078F DIAST BP <80 MM HG: CPT

## 2024-04-16 PROCEDURE — 1123F ACP DISCUSS/DSCN MKR DOCD: CPT

## 2024-04-16 PROCEDURE — 99214 OFFICE O/P EST MOD 30 MIN: CPT

## 2024-04-16 PROCEDURE — 3074F SYST BP LT 130 MM HG: CPT

## 2024-04-16 RX ORDER — ATOMOXETINE 25 MG/1
25 CAPSULE ORAL DAILY
Qty: 90 CAPSULE | Refills: 2 | Status: SHIPPED | OUTPATIENT
Start: 2024-04-16

## 2024-04-16 RX ORDER — DONEPEZIL HYDROCHLORIDE 10 MG/1
10 TABLET, FILM COATED ORAL DAILY
Qty: 90 TABLET | Refills: 3 | Status: SHIPPED | OUTPATIENT
Start: 2024-04-16

## 2024-04-16 RX ORDER — MEMANTINE HYDROCHLORIDE 10 MG/1
10 TABLET ORAL 2 TIMES DAILY
Qty: 180 TABLET | Refills: 3 | Status: SHIPPED | OUTPATIENT
Start: 2024-04-16

## 2024-04-16 NOTE — PROGRESS NOTES
Dementia- per the patient he stated he has been doing okay   Accompanied by his wife   Per his wife memory has declined quite a bit, very quiet and has been sleeping a lot more than usual

## 2024-04-16 NOTE — PROGRESS NOTES
Jose Guzman is a 84 y.o. male who presents with the following  Chief Complaint   Patient presents with    Follow-up     Dementia        HPI  Patient is here today with his wife for dementia follow-up.  Patient was last seen August 4, 2022 by Glen Bolton NP at which time the patient was established on Aricept 10 mg daily and Namenda 10 mg twice daily for memory and Strattera 25 mg for attention.  Patient had had no big change in memory at that time needed help with meds, feeding the cats but the patient's wife was cooking and driving.  He was trying to do puzzles and games to keep his brain active.  Today the patient's wife states that he has had quite a bit of decline in his memory since he was here last.  Patient's wife states that he is very quiet and sleeps more than he used to.  She says that he is no longer doing puzzles or games.  He is eating well and has not had any falls.  His mood is good.  Since he was here last, he did have an ER visit in February 2023 for dehydration.  He has not had any further hospitalizations since then.    Today the patient could tell me his date of birth, where he is, that the person with him is his wife and her name and who the current president is.  He could not tell me the day of the week, the month, the year, the floor that we are on, or how much a quarter dime and shaan make.    No Known Allergies    Current Outpatient Medications   Medication Sig Dispense Refill    donepezil (ARICEPT) 10 MG tablet Take 1 tablet by mouth daily 90 tablet 3    atomoxetine (STRATTERA) 25 MG capsule Take 1 capsule by mouth daily 90 capsule 2    memantine (NAMENDA) 10 MG tablet Take 1 tablet by mouth 2 times daily 180 tablet 3    valsartan-hydroCHLOROthiazide (DIOVAN-HCT) 160-12.5 MG per tablet Take 1 tablet by mouth daily      metFORMIN (GLUCOPHAGE) 1000 MG tablet Take 1 tablet by mouth 2 times daily      atorvastatin (LIPITOR) 20 MG tablet TAKE 1 TABLET BY MOUTH EVERY DAY AT BEDTIME FOR 90

## 2024-10-16 ENCOUNTER — OFFICE VISIT (OUTPATIENT)
Age: 84
End: 2024-10-16
Payer: MEDICARE

## 2024-10-16 VITALS
HEART RATE: 98 BPM | SYSTOLIC BLOOD PRESSURE: 112 MMHG | OXYGEN SATURATION: 97 % | RESPIRATION RATE: 20 BRPM | DIASTOLIC BLOOD PRESSURE: 72 MMHG

## 2024-10-16 DIAGNOSIS — G30.1 ALZHEIMER'S DISEASE WITH LATE ONSET (CODE) (HCC): Primary | ICD-10-CM

## 2024-10-16 DIAGNOSIS — F90.0 ATTENTION-DEFICIT HYPERACTIVITY DISORDER, PREDOMINANTLY INATTENTIVE TYPE: ICD-10-CM

## 2024-10-16 PROCEDURE — 1036F TOBACCO NON-USER: CPT

## 2024-10-16 PROCEDURE — 3074F SYST BP LT 130 MM HG: CPT

## 2024-10-16 PROCEDURE — 3078F DIAST BP <80 MM HG: CPT

## 2024-10-16 PROCEDURE — G8421 BMI NOT CALCULATED: HCPCS

## 2024-10-16 PROCEDURE — 99214 OFFICE O/P EST MOD 30 MIN: CPT

## 2024-10-16 PROCEDURE — G8427 DOCREV CUR MEDS BY ELIG CLIN: HCPCS

## 2024-10-16 PROCEDURE — G8484 FLU IMMUNIZE NO ADMIN: HCPCS

## 2024-10-16 PROCEDURE — 1123F ACP DISCUSS/DSCN MKR DOCD: CPT

## 2024-10-16 NOTE — PROGRESS NOTES
Dementia- he is not quite sure about his memory   Accompanied by his wife  Has been doing okay for certain things, and other things he may not quite get right away but he will Douglas back to it    
established on Aricept 10 mg nightly, Namenda 10 mg twice a day, and Strattera 25 mg.  Patient's wife feels like there has been even more mental decline since his last visit 6 months ago.  She says that he is now having difficulty remembering family members names at times.  They will look at photo albums together and go over names frequently.  Patient's wife says that he is still eating and sleeping well as a matter fact sleeps more than he used to.  He does say that he likes to eat pancakes.  He did see his primary care yesterday who said that he has gained a few pounds which he was pleased with and that he seems to be doing well overall.  He has not had any sicknesses or falls since his last visit here.  The patient does say that he does not do anything to keep his brain active except for watching TV.  He says that he likes to watch lets make a deal.  The patient's wife says that they get out every day whether it is to take walks, to run errands or to visit their daughter who lives nearby.  His mood is good.  Today the patient can tell me his date of birth, where he is, the state we are in, the current year, how much a quarter kat and shaan make together, he could tell me the month when I told him that Halloween was this month.  He could not tell me the current president's name as he thought it was [place] and could not tell me who is currently running for [place].  No Known Allergies    Current Outpatient Medications   Medication Sig Dispense Refill    donepezil (ARICEPT) 10 MG tablet Take 1 tablet by mouth daily 90 tablet 3    atomoxetine (STRATTERA) 25 MG capsule Take 1 capsule by mouth daily 90 capsule 2    memantine (NAMENDA) 10 MG tablet Take 1 tablet by mouth 2 times daily 180 tablet 3    valsartan-hydroCHLOROthiazide (DIOVAN-HCT) 160-12.5 MG per tablet Take 1 tablet by mouth daily      metFORMIN (GLUCOPHAGE) 1000 MG tablet Take 1 tablet by mouth 2 times daily      atorvastatin (LIPITOR) 20 MG tablet TAKE

## 2024-11-29 DIAGNOSIS — F90.0 ATTENTION-DEFICIT HYPERACTIVITY DISORDER, PREDOMINANTLY INATTENTIVE TYPE: ICD-10-CM

## 2024-11-29 DIAGNOSIS — G30.1 ALZHEIMER'S DISEASE WITH LATE ONSET (CODE) (HCC): ICD-10-CM

## 2024-11-29 RX ORDER — ATOMOXETINE 25 MG/1
CAPSULE ORAL DAILY
Qty: 90 CAPSULE | Refills: 2 | Status: SHIPPED | OUTPATIENT
Start: 2024-11-29

## 2025-01-10 ENCOUNTER — TELEPHONE (OUTPATIENT)
Age: 85
End: 2025-01-10

## 2025-01-10 NOTE — TELEPHONE ENCOUNTER
Re: Strattera    [facility] in [place] closed PA in epic but it predicted no PA required. No key # listed. Do not see this med often so created case in [facility] Key# [id number] , submitted and rcvd notice available without auth. Closed and updated script referral.

## 2025-04-15 NOTE — PROGRESS NOTES
Jose Guzman is a 85 y.o. male who presents with the following  Chief Complaint   Patient presents with    Follow-up     Dementia      Last office visit note  HPI  Patient is here today with his wife for dementia follow-up.  Patient is established on Aricept 10 mg nightly, Namenda 10 mg twice a day, and Strattera 25 mg.  Patient's wife feels like there has been even more mental decline since his last visit 6 months ago.  She says that he is now having difficulty remembering family members names at times.  They will look at photo albums together and go over names frequently.  Patient's wife says that he is still eating and sleeping well as a matter fact sleeps more than he used to.  He does say that he likes to eat pancakes.  He did see his primary care yesterday who said that he has gained a few pounds which he was pleased with and that he seems to be doing well overall.  He has not had any sicknesses or falls since his last visit here.  The patient does say that he does not do anything to keep his brain active except for watching TV.  He says that he likes to watch lets make a deal.  The patient's wife says that they get out every day whether it is to take walks, to run errands or to visit their daughter who lives nearby.  His mood is good.  Today the patient can tell me his date of birth, where he is, the state we are in, the current year, how much a quarter dimelvie and shaan make together, he could tell me the month when I told him that Halloween was this month.  He could not tell me the current president's name as he thought it was [employer] and could not tell me who is currently running for president.    Continue taking Aricept 10 mg daily and Namenda 10 mg twice a day to try and slow memory loss progression.    Continue using Strattera 25 mg daily for attention.       Urged the patient to keep his brain active is much as possible to stay socially active.  As long as he is doing well he may follow-up in 6

## 2025-04-16 ENCOUNTER — OFFICE VISIT (OUTPATIENT)
Age: 85
End: 2025-04-16
Payer: MEDICARE

## 2025-04-16 VITALS
DIASTOLIC BLOOD PRESSURE: 80 MMHG | RESPIRATION RATE: 20 BRPM | SYSTOLIC BLOOD PRESSURE: 134 MMHG | OXYGEN SATURATION: 95 % | HEART RATE: 102 BPM

## 2025-04-16 DIAGNOSIS — G30.1 ALZHEIMER'S DISEASE WITH LATE ONSET (CODE): ICD-10-CM

## 2025-04-16 DIAGNOSIS — F90.0 ATTENTION-DEFICIT HYPERACTIVITY DISORDER, PREDOMINANTLY INATTENTIVE TYPE: ICD-10-CM

## 2025-04-16 PROCEDURE — 3079F DIAST BP 80-89 MM HG: CPT

## 2025-04-16 PROCEDURE — G8427 DOCREV CUR MEDS BY ELIG CLIN: HCPCS

## 2025-04-16 PROCEDURE — 1160F RVW MEDS BY RX/DR IN RCRD: CPT

## 2025-04-16 PROCEDURE — 1123F ACP DISCUSS/DSCN MKR DOCD: CPT

## 2025-04-16 PROCEDURE — 1036F TOBACCO NON-USER: CPT

## 2025-04-16 PROCEDURE — G8421 BMI NOT CALCULATED: HCPCS

## 2025-04-16 PROCEDURE — 1126F AMNT PAIN NOTED NONE PRSNT: CPT

## 2025-04-16 PROCEDURE — 3075F SYST BP GE 130 - 139MM HG: CPT

## 2025-04-16 PROCEDURE — 1159F MED LIST DOCD IN RCRD: CPT

## 2025-04-16 PROCEDURE — 99214 OFFICE O/P EST MOD 30 MIN: CPT

## 2025-04-16 RX ORDER — MEMANTINE HYDROCHLORIDE 10 MG/1
10 TABLET ORAL 2 TIMES DAILY
Qty: 180 TABLET | Refills: 3 | Status: SHIPPED | OUTPATIENT
Start: 2025-04-16

## 2025-04-16 RX ORDER — DONEPEZIL HYDROCHLORIDE 10 MG/1
10 TABLET, FILM COATED ORAL DAILY
Qty: 90 TABLET | Refills: 3 | Status: SHIPPED | OUTPATIENT
Start: 2025-04-16

## 2025-04-16 RX ORDER — ATOMOXETINE 25 MG/1
25 CAPSULE ORAL DAILY
Qty: 90 CAPSULE | Refills: 3 | Status: SHIPPED | OUTPATIENT
Start: 2025-04-16

## (undated) DEVICE — BAG SPEC BIOHZRD 10 X 10 IN --

## (undated) DEVICE — Device

## (undated) DEVICE — CUFF ADULT 1 PC 1 VINYL DISP --

## (undated) DEVICE — KENDALL RADIOLUCENT FOAM MONITORING ELECTRODE -RECTANGULAR SHAPE: Brand: KENDALL

## (undated) DEVICE — SOLIDIFIER MEDC 1200ML -- CONVERT TO 356117

## (undated) DEVICE — NDL FLTR TIP 5 MIC 18GX1.5IN --

## (undated) DEVICE — TRAP SUC MUCOUS 70ML -- MEDICHOICE MEDLINE

## (undated) DEVICE — KIT COLON W/ 1.1OZ LUB AND 2 END

## (undated) DEVICE — 1200 GUARD II KIT W/5MM TUBE W/O VAC TUBE: Brand: GUARDIAN

## (undated) DEVICE — CONTAINER SPEC 20 ML LID NEUT BUFF FORMALIN 10 % POLYPR STS

## (undated) DEVICE — SYR 5ML 1/5 GRAD LL NSAF LF --

## (undated) DEVICE — CATH IV AUTOGRD BC BLU 22GA 25 -- INSYTE

## (undated) DEVICE — SNARE ENDOSCP M L240CM W27MM SHTH DIA2.4MM CHN 2.8MM OVL

## (undated) DEVICE — SET ADMIN 16ML TBNG L100IN 2 Y INJ SITE IV PIGGY BK DISP

## (undated) DEVICE — CANN NASAL O2 CAPNOGRAPHY AD -- FILTERLINE

## (undated) DEVICE — ADULT SPO2 SENSOR: Brand: NELLCOR

## (undated) DEVICE — CATH IV AUTOGRD BC PNK 20GA 25 -- INSYTE

## (undated) DEVICE — BAG BELONG PT PERS CLEAR HANDL

## (undated) DEVICE — BASIN EMSIS 16OZ GRAPHITE PLAS KID SHP MOLD GRAD FOR ORAL

## (undated) DEVICE — TUBING ADMIN SET INTRAV ARTERI -- CONVERT TO ITEM 340436

## (undated) DEVICE — 3M™ CUROS™ DISINFECTING CAP FOR NEEDLELESS CONNECTORS 270/CARTON 20 CARTONS/CASE CFF1-270: Brand: CUROS™